# Patient Record
Sex: FEMALE | Race: WHITE | Employment: OTHER | URBAN - METROPOLITAN AREA
[De-identification: names, ages, dates, MRNs, and addresses within clinical notes are randomized per-mention and may not be internally consistent; named-entity substitution may affect disease eponyms.]

---

## 2023-03-11 ENCOUNTER — APPOINTMENT (OUTPATIENT)
Dept: GENERAL RADIOLOGY | Age: 58
End: 2023-03-11
Payer: MEDICARE

## 2023-03-11 ENCOUNTER — HOSPITAL ENCOUNTER (EMERGENCY)
Age: 58
Discharge: NURSING, REHAB OR LONG-TERM CARE | End: 2023-03-12
Attending: EMERGENCY MEDICINE | Admitting: HOSPITALIST
Payer: MEDICARE
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
## 2023-03-11 LAB
ANION GAP SERPL CALCULATED.3IONS-SCNC: 10 MMOL/L (ref 3–16)
BASOPHILS ABSOLUTE: 0.1 K/UL (ref 0–0.2)
BASOPHILS RELATIVE PERCENT: 0.4 %
BUN BLDV-MCNC: 10 MG/DL (ref 7–20)
CALCIUM SERPL-MCNC: 8.3 MG/DL (ref 8.3–10.6)
CHLORIDE BLD-SCNC: 101 MMOL/L (ref 99–110)
CO2: 25 MMOL/L (ref 21–32)
CREAT SERPL-MCNC: 0.6 MG/DL (ref 0.6–1.1)
EOSINOPHILS ABSOLUTE: 0 K/UL (ref 0–0.6)
EOSINOPHILS RELATIVE PERCENT: 0.1 %
GFR SERPL CREATININE-BSD FRML MDRD: >60 ML/MIN/{1.73_M2}
GLUCOSE BLD-MCNC: 112 MG/DL (ref 70–99)
HCT VFR BLD CALC: 31.8 % (ref 36–48)
HEMOGLOBIN: 10.5 G/DL (ref 12–16)
LYMPHOCYTES ABSOLUTE: 0.9 K/UL (ref 1–5.1)
LYMPHOCYTES RELATIVE PERCENT: 6.9 %
MCH RBC QN AUTO: 30.8 PG (ref 26–34)
MCHC RBC AUTO-ENTMCNC: 33 G/DL (ref 31–36)
MCV RBC AUTO: 93.1 FL (ref 80–100)
MONOCYTES ABSOLUTE: 1.3 K/UL (ref 0–1.3)
MONOCYTES RELATIVE PERCENT: 9.9 %
NEUTROPHILS ABSOLUTE: 10.6 K/UL (ref 1.7–7.7)
NEUTROPHILS RELATIVE PERCENT: 82.7 %
PDW BLD-RTO: 15 % (ref 12.4–15.4)
PLATELET # BLD: 210 K/UL (ref 135–450)
PMV BLD AUTO: 9 FL (ref 5–10.5)
POTASSIUM REFLEX MAGNESIUM: 5.3 MMOL/L (ref 3.5–5.1)
PRO-BNP: 237 PG/ML (ref 0–124)
RBC # BLD: 3.42 M/UL (ref 4–5.2)
SODIUM BLD-SCNC: 136 MMOL/L (ref 136–145)
TROPONIN: <0.01 NG/ML
WBC # BLD: 12.8 K/UL (ref 4–11)

## 2023-03-11 PROCEDURE — 99285 EMERGENCY DEPT VISIT HI MDM: CPT

## 2023-03-11 PROCEDURE — 84484 ASSAY OF TROPONIN QUANT: CPT

## 2023-03-11 PROCEDURE — 73502 X-RAY EXAM HIP UNI 2-3 VIEWS: CPT

## 2023-03-11 PROCEDURE — 73562 X-RAY EXAM OF KNEE 3: CPT

## 2023-03-11 PROCEDURE — 83880 ASSAY OF NATRIURETIC PEPTIDE: CPT

## 2023-03-11 PROCEDURE — 85025 COMPLETE CBC W/AUTO DIFF WBC: CPT

## 2023-03-11 PROCEDURE — 93005 ELECTROCARDIOGRAM TRACING: CPT | Performed by: STUDENT IN AN ORGANIZED HEALTH CARE EDUCATION/TRAINING PROGRAM

## 2023-03-11 PROCEDURE — 80048 BASIC METABOLIC PNL TOTAL CA: CPT

## 2023-03-11 RX ORDER — DROPERIDOL 2.5 MG/ML
0.62 INJECTION, SOLUTION INTRAMUSCULAR; INTRAVENOUS EVERY 6 HOURS PRN
Status: DISCONTINUED | OUTPATIENT
Start: 2023-03-11 | End: 2023-03-11

## 2023-03-11 RX ORDER — KETOROLAC TROMETHAMINE 30 MG/ML
15 INJECTION, SOLUTION INTRAMUSCULAR; INTRAVENOUS ONCE
Status: DISCONTINUED | OUTPATIENT
Start: 2023-03-11 | End: 2023-03-12 | Stop reason: HOSPADM

## 2023-03-12 VITALS
HEART RATE: 98 BPM | OXYGEN SATURATION: 93 % | TEMPERATURE: 98.7 F | SYSTOLIC BLOOD PRESSURE: 117 MMHG | DIASTOLIC BLOOD PRESSURE: 81 MMHG | RESPIRATION RATE: 21 BRPM

## 2023-03-12 PROBLEM — R55 SYNCOPE, NEAR: Status: ACTIVE | Noted: 2023-03-12

## 2023-03-12 LAB
EKG ATRIAL RATE: 110 BPM
EKG DIAGNOSIS: NORMAL
EKG P AXIS: 55 DEGREES
EKG P-R INTERVAL: 166 MS
EKG Q-T INTERVAL: 466 MS
EKG QRS DURATION: 118 MS
EKG QTC CALCULATION (BAZETT): 627 MS
EKG R AXIS: 57 DEGREES
EKG T AXIS: 40 DEGREES
EKG VENTRICULAR RATE: 109 BPM

## 2023-03-12 PROCEDURE — 1200000000 HC SEMI PRIVATE

## 2023-03-12 ASSESSMENT — ENCOUNTER SYMPTOMS
SHORTNESS OF BREATH: 0
ABDOMINAL PAIN: 0
BACK PAIN: 1
RECTAL PAIN: 0
SORE THROAT: 0

## 2023-03-12 NOTE — ED PROVIDER NOTES
ED Attending Attestation Note     Date of evaluation: 3/11/2023    This patient was seen by the resident. I have seen and examined the patient, agree with the workup, evaluation, management and diagnosis. The care plan has been discussed. I have reviewed the ECG and concur with the resident's interpretation. My assessment reveals adult female who presents after essentially a slide off of the couch today. He is moving all extremities at her baseline. No gross tenderness is appreciated at the time my exam with any extremities. Did initially complain of some back pain, but is very low energy/low risk fall. Did not strike her head or lose consciousness. Yvonne Wiley MD  03/12/23 0285

## 2023-03-12 NOTE — DISCHARGE INSTRUCTIONS
Arley Parsons was seen for evaluation after a fall. She had x-rays of her hips and knees that did not show any broken bones. She was thoroughly evaluated and felt to have no further injuries and is stable to return to your facility at this point. She can use Tylenol and ibuprofen as needed for pain.

## 2023-03-12 NOTE — ED PROVIDER NOTES
[facility]          EM RESIDENT NOTE       Date of evaluation: 3/11/2023    Chief Complaint     Fall    of Present Illness     Toshia Sherwood is a 65 y.o. female with PMHx of panhypopituitarism, bilateral knee OA, morbid obesity presenting from SNF for evaluation after a fall. Patient reports she was sitting on the couch when she became lightheaded and slid off the couch onto the ground. Patient notes she has chronic lightheadedness and this did not feel any different. Nursing home staff report they assisted her to the ground and patient did not strike her head or lose consciousness. Not on anticoagulation. Patient reports she has had back pain and bilateral knee pain since the fall. Denies any associated shortness of breath, chest pain, fevers, chills, nausea, vomiting, headache, focal weakness or numbness. Review of Systems     Review of Systems   Constitutional:  Negative for chills and fever. HENT:  Negative for congestion and sore throat. Respiratory:  Negative for shortness of breath. Cardiovascular:  Negative for chest pain. Gastrointestinal:  Negative for abdominal pain and rectal pain. Genitourinary:  Negative for dysuria. Musculoskeletal:  Positive for arthralgias and back pain. Negative for neck pain. Neurological:  Positive for dizziness and light-headedness. Negative for weakness and headaches. Past Medical, Surgical, Family, and Social History     She has no past medical history on file. She has no past surgical history on file. Her family history is not on file. She     Medications     Previous Medications    No medications on file       Allergies     She is allergic to ketorolac, sulfanilamide, and tramadol. Physical Exam     INITIAL VITALS: BP: 110/64, Temp: 98.7 °F (37.1 °C), Heart Rate: (!) 110, Resp: 21, SpO2: 93 %   Physical Exam  Vitals and nursing note reviewed. Constitutional:       Appearance: She is obese.    HENT: Head: Normocephalic and atraumatic. Mouth/Throat:      Mouth: Mucous membranes are moist.   Eyes:      Extraocular Movements: Extraocular movements intact. Pupils: Pupils are equal, round, and reactive to light. Cardiovascular:      Rate and Rhythm: Regular rhythm. Tachycardia present. Pulmonary:      Effort: Pulmonary effort is normal. No respiratory distress. Breath sounds: No wheezing or rales. Abdominal:      General: There is no distension. Palpations: Abdomen is soft. Tenderness: There is no abdominal tenderness. Musculoskeletal:      Comments: Diffuse back tenderness on exam without any focality along midline versus paraspinal.  Tender to palpation over right hip and right knee without any overlying erythema/warmth, no effusion, moving all joints spontaneously without difficulty, neurovascularly intact in the bilateral lower extremities. Skin:     General: Skin is warm and dry. Neurological:      General: No focal deficit present. Mental Status: She is alert and oriented to person, place, and time. Psychiatric:         Mood and Affect: Mood normal.         Behavior: Behavior normal.       DiagnosticResults     EKG   Interpreted in conjunction with emergencydepartment physician Stalin Reddy, *  Sinus tachycardia, rate 109. RBBB. Normal axis. QT prolonged in 600s. No acute ST/T change. RADIOLOGY:  XR HIP 2-3 VW W PELVIS RIGHT   Final Result      No radiographic evidence of acute fracture. XR KNEE RIGHT (3 VIEWS)   Final Result      No radiographic evidence of acute fracture. Mild degenerative changes.           LABS:   Results for orders placed or performed during the hospital encounter of 03/11/23   Troponin   Result Value Ref Range    Troponin <0.01 <0.01 ng/mL   CBC with Auto Differential   Result Value Ref Range    WBC 12.8 (H) 4.0 - 11.0 K/uL    RBC 3.42 (L) 4.00 - 5.20 M/uL    Hemoglobin 10.5 (L) 12.0 - 16.0 g/dL    Hematocrit 31.8 (L) 36.0 - 48.0 %    MCV 93.1 80.0 - 100.0 fL    MCH 30.8 26.0 - 34.0 pg    MCHC 33.0 31.0 - 36.0 g/dL    RDW 15.0 12.4 - 15.4 %    Platelets 210 135 - 450 K/uL    MPV 9.0 5.0 - 10.5 fL    Neutrophils % 82.7 %    Lymphocytes % 6.9 %    Monocytes % 9.9 %    Eosinophils % 0.1 %    Basophils % 0.4 %    Neutrophils Absolute 10.6 (H) 1.7 - 7.7 K/uL    Lymphocytes Absolute 0.9 (L) 1.0 - 5.1 K/uL    Monocytes Absolute 1.3 0.0 - 1.3 K/uL    Eosinophils Absolute 0.0 0.0 - 0.6 K/uL    Basophils Absolute 0.1 0.0 - 0.2 K/uL   BMP w/ Reflex to MG   Result Value Ref Range    Sodium 136 136 - 145 mmol/L    Potassium reflex Magnesium 5.3 (H) 3.5 - 5.1 mmol/L    Chloride 101 99 - 110 mmol/L    CO2 25 21 - 32 mmol/L    Anion Gap 10 3 - 16    Glucose 112 (H) 70 - 99 mg/dL    BUN 10 7 - 20 mg/dL    Creatinine 0.6 0.6 - 1.1 mg/dL    Est, Glom Filt Rate >60 >60    Calcium 8.3 8.3 - 10.6 mg/dL   Brain Natriuretic Peptide   Result Value Ref Range    Pro- (H) 0 - 124 pg/mL       ED BEDSIDE ULTRASOUND:  No results found.    RECENT VITALS:  BP: 110/64, Temp: 98.7 °F (37.1 °C), Heart Rate: (!) 110,Resp: 21, SpO2: 93 %       ED Course     Nursing Notes, Past Medical Hx, Past Surgical Hx, Social Hx, Allergies, and Family Hx were reviewed.         The patient was given the followingmedications:  Orders Placed This Encounter   Medications    DISCONTD: droperidol (INAPSINE) injection 0.625 mg    ketorolac (TORADOL) injection 15 mg       CONSULTS:  None    MEDICAL DECISION MAKING / ASSESSMENT / PLAN     Melani Anguiano is a 57 y.o. female with past medical history as noted above presents from SNF for evaluation after she slid off the couch.  She reports back pain and knee pain after the fall.  On my evaluation she is in no acute distress, tachycardic in triage but resolved without intervention.  She has diffuse pain over the back without any focality over the midline or certain spinal level.  She has mild tenderness over the right hip and right  knee but ranging these joints without issue.  Neurovascular intact in the extremities.  X-ray of the pelvis and right hip was obtained as well as of the right knee without any fracture.  She was treated with Toradol with significant improvement in her pain.  On repeat exam no further injuries identified.  Patient noted she had lightheadedness before her fall but states this is chronic and unchanged.  Her labs are notable for very mild leukocytosis but is afebrile without any recent infectious symptoms, mild anemia with Hb of 10.5, unremarkable renal profile (notably normal sodium with h/o hyponatremia). Non-ischemic EKG, normal troponin and BNP. Lower suspicion for acute cardiac etiology at this point.     She was transferred back to her facility in stable condition.    Is this patient to be included in the SEP-1 core measure due to severe sepsis or septic shock? No Exclusion criteria - the patient is NOT to be included for SEP-1 Core Measure due to: Infection is not suspected    This patient was also evaluated by the attending physician. All care plans werediscussed and agreed upon.    Clinical Impression     1. Fall, initial encounter        Disposition     PATIENT REFERRED TO:  No follow-up provider specified.    DISCHARGE MEDICATIONS:  New Prescriptions    No medications on file       DISPOSITION Decision To Discharge 03/12/2023 12:32:01 AM       Justine Milligan, MD  03/12/23 0423

## 2023-03-16 ENCOUNTER — APPOINTMENT (OUTPATIENT)
Dept: GENERAL RADIOLOGY | Age: 58
DRG: 313 | End: 2023-03-16
Payer: MEDICARE

## 2023-03-16 ENCOUNTER — HOSPITAL ENCOUNTER (INPATIENT)
Age: 58
LOS: 5 days | Discharge: NURSING, REHAB OR LONG-TERM CARE | DRG: 313 | End: 2023-03-22
Attending: EMERGENCY MEDICINE | Admitting: INTERNAL MEDICINE
Payer: MEDICARE

## 2023-03-16 DIAGNOSIS — F41.9 ANXIETY DISORDER, UNSPECIFIED TYPE: ICD-10-CM

## 2023-03-16 DIAGNOSIS — R07.9 CHEST PAIN, UNSPECIFIED TYPE: Primary | ICD-10-CM

## 2023-03-16 LAB
ANION GAP SERPL CALCULATED.3IONS-SCNC: 9 MMOL/L (ref 3–16)
BASOPHILS # BLD: 0 K/UL (ref 0–0.2)
BASOPHILS NFR BLD: 0.4 %
BUN SERPL-MCNC: 6 MG/DL (ref 7–20)
CALCIUM SERPL-MCNC: 8.6 MG/DL (ref 8.3–10.6)
CHLORIDE SERPL-SCNC: 104 MMOL/L (ref 99–110)
CO2 SERPL-SCNC: 28 MMOL/L (ref 21–32)
CREAT SERPL-MCNC: <0.5 MG/DL (ref 0.6–1.1)
DEPRECATED RDW RBC AUTO: 15.2 % (ref 12.4–15.4)
EOSINOPHIL # BLD: 0.2 K/UL (ref 0–0.6)
EOSINOPHIL NFR BLD: 3.5 %
GFR SERPLBLD CREATININE-BSD FMLA CKD-EPI: >60 ML/MIN/{1.73_M2}
GLUCOSE SERPL-MCNC: 101 MG/DL (ref 70–99)
HCT VFR BLD AUTO: 33.6 % (ref 36–48)
HGB BLD-MCNC: 10.9 G/DL (ref 12–16)
LYMPHOCYTES # BLD: 1.4 K/UL (ref 1–5.1)
LYMPHOCYTES NFR BLD: 28.5 %
MCH RBC QN AUTO: 30 PG (ref 26–34)
MCHC RBC AUTO-ENTMCNC: 32.5 G/DL (ref 31–36)
MCV RBC AUTO: 92.4 FL (ref 80–100)
MONOCYTES # BLD: 0.8 K/UL (ref 0–1.3)
MONOCYTES NFR BLD: 15.6 %
NEUTROPHILS # BLD: 2.6 K/UL (ref 1.7–7.7)
NEUTROPHILS NFR BLD: 52 %
NT-PROBNP SERPL-MCNC: 148 PG/ML (ref 0–124)
PLATELET # BLD AUTO: 195 K/UL (ref 135–450)
PMV BLD AUTO: 8.6 FL (ref 5–10.5)
POTASSIUM SERPL-SCNC: 3.8 MMOL/L (ref 3.5–5.1)
RBC # BLD AUTO: 3.63 M/UL (ref 4–5.2)
SODIUM SERPL-SCNC: 141 MMOL/L (ref 136–145)
TROPONIN T SERPL-MCNC: <0.01 NG/ML
WBC # BLD AUTO: 5 K/UL (ref 4–11)

## 2023-03-16 PROCEDURE — 36415 COLL VENOUS BLD VENIPUNCTURE: CPT

## 2023-03-16 PROCEDURE — 83880 ASSAY OF NATRIURETIC PEPTIDE: CPT

## 2023-03-16 PROCEDURE — 93005 ELECTROCARDIOGRAM TRACING: CPT | Performed by: PHYSICIAN ASSISTANT

## 2023-03-16 PROCEDURE — 99285 EMERGENCY DEPT VISIT HI MDM: CPT

## 2023-03-16 PROCEDURE — 71045 X-RAY EXAM CHEST 1 VIEW: CPT

## 2023-03-16 PROCEDURE — 84484 ASSAY OF TROPONIN QUANT: CPT

## 2023-03-16 PROCEDURE — 80048 BASIC METABOLIC PNL TOTAL CA: CPT

## 2023-03-16 PROCEDURE — 85025 COMPLETE CBC W/AUTO DIFF WBC: CPT

## 2023-03-16 ASSESSMENT — PAIN DESCRIPTION - LOCATION: LOCATION: CHEST

## 2023-03-16 ASSESSMENT — PAIN - FUNCTIONAL ASSESSMENT: PAIN_FUNCTIONAL_ASSESSMENT: 0-10

## 2023-03-16 ASSESSMENT — PAIN DESCRIPTION - DESCRIPTORS: DESCRIPTORS: PRESSURE

## 2023-03-16 ASSESSMENT — PAIN SCALES - GENERAL: PAINLEVEL_OUTOF10: 9

## 2023-03-16 ASSESSMENT — ENCOUNTER SYMPTOMS
COUGH: 0
SHORTNESS OF BREATH: 1

## 2023-03-17 PROBLEM — R07.9 CHEST PAIN: Status: ACTIVE | Noted: 2023-03-17

## 2023-03-17 LAB
AMPHETAMINES UR QL SCN>1000 NG/ML: ABNORMAL
BARBITURATES UR QL SCN>200 NG/ML: ABNORMAL
BENZODIAZ UR QL SCN>200 NG/ML: POSITIVE
CANNABINOIDS UR QL SCN>50 NG/ML: ABNORMAL
CHOLEST SERPL-MCNC: 118 MG/DL (ref 0–199)
COCAINE UR QL SCN: ABNORMAL
DRUG SCREEN COMMENT UR-IMP: ABNORMAL
EKG ATRIAL RATE: 101 BPM
EKG ATRIAL RATE: 89 BPM
EKG DIAGNOSIS: NORMAL
EKG DIAGNOSIS: NORMAL
EKG P AXIS: 55 DEGREES
EKG P AXIS: 67 DEGREES
EKG P-R INTERVAL: 166 MS
EKG P-R INTERVAL: 170 MS
EKG Q-T INTERVAL: 368 MS
EKG Q-T INTERVAL: 414 MS
EKG QRS DURATION: 132 MS
EKG QRS DURATION: 136 MS
EKG QTC CALCULATION (BAZETT): 477 MS
EKG QTC CALCULATION (BAZETT): 503 MS
EKG R AXIS: 58 DEGREES
EKG R AXIS: 60 DEGREES
EKG T AXIS: 28 DEGREES
EKG T AXIS: 8 DEGREES
EKG VENTRICULAR RATE: 101 BPM
EKG VENTRICULAR RATE: 89 BPM
FENTANYL SCREEN, URINE: ABNORMAL
GLUCOSE BLD-MCNC: 114 MG/DL (ref 70–99)
HDLC SERPL-MCNC: 17 MG/DL (ref 40–60)
LDLC SERPL CALC-MCNC: 80 MG/DL
LV EF: 58 %
LVEF MODALITY: NORMAL
METHADONE UR QL SCN>300 NG/ML: ABNORMAL
OPIATES UR QL SCN>300 NG/ML: ABNORMAL
OXYCODONE UR QL SCN: ABNORMAL
PCP UR QL SCN>25 NG/ML: ABNORMAL
PERFORMED ON: ABNORMAL
PH UR STRIP: 7.5 [PH]
TRIGL SERPL-MCNC: 104 MG/DL (ref 0–150)
TROPONIN T SERPL-MCNC: <0.01 NG/ML
VLDLC SERPL CALC-MCNC: 21 MG/DL

## 2023-03-17 PROCEDURE — 1200000000 HC SEMI PRIVATE

## 2023-03-17 PROCEDURE — 6370000000 HC RX 637 (ALT 250 FOR IP): Performed by: HOSPITALIST

## 2023-03-17 PROCEDURE — 36415 COLL VENOUS BLD VENIPUNCTURE: CPT

## 2023-03-17 PROCEDURE — 80061 LIPID PANEL: CPT

## 2023-03-17 PROCEDURE — 80307 DRUG TEST PRSMV CHEM ANLYZR: CPT

## 2023-03-17 PROCEDURE — 6360000002 HC RX W HCPCS: Performed by: INTERNAL MEDICINE

## 2023-03-17 PROCEDURE — 99222 1ST HOSP IP/OBS MODERATE 55: CPT | Performed by: INTERNAL MEDICINE

## 2023-03-17 PROCEDURE — 2580000003 HC RX 258: Performed by: INTERNAL MEDICINE

## 2023-03-17 PROCEDURE — 84484 ASSAY OF TROPONIN QUANT: CPT

## 2023-03-17 PROCEDURE — 93005 ELECTROCARDIOGRAM TRACING: CPT | Performed by: INTERNAL MEDICINE

## 2023-03-17 PROCEDURE — 93010 ELECTROCARDIOGRAM REPORT: CPT | Performed by: INTERNAL MEDICINE

## 2023-03-17 PROCEDURE — C8929 TTE W OR WO FOL WCON,DOPPLER: HCPCS

## 2023-03-17 RX ORDER — ONDANSETRON 4 MG/1
4 TABLET, ORALLY DISINTEGRATING ORAL EVERY 8 HOURS PRN
Status: DISCONTINUED | OUTPATIENT
Start: 2023-03-17 | End: 2023-03-17 | Stop reason: ALTCHOICE

## 2023-03-17 RX ORDER — ACETAMINOPHEN 325 MG/1
650 TABLET ORAL EVERY 6 HOURS PRN
Status: DISCONTINUED | OUTPATIENT
Start: 2023-03-17 | End: 2023-03-22 | Stop reason: HOSPADM

## 2023-03-17 RX ORDER — ALPRAZOLAM 2 MG/1
2 TABLET ORAL EVERY 8 HOURS
Status: ON HOLD | COMMUNITY
End: 2023-03-20 | Stop reason: HOSPADM

## 2023-03-17 RX ORDER — MECOBALAMIN 5000 MCG
10 TABLET,DISINTEGRATING ORAL NIGHTLY
Status: DISCONTINUED | OUTPATIENT
Start: 2023-03-17 | End: 2023-03-22 | Stop reason: HOSPADM

## 2023-03-17 RX ORDER — NITROGLYCERIN 0.4 MG/1
0.4 TABLET SUBLINGUAL EVERY 5 MIN PRN
Status: DISCONTINUED | OUTPATIENT
Start: 2023-03-17 | End: 2023-03-22 | Stop reason: HOSPADM

## 2023-03-17 RX ORDER — ACETAMINOPHEN 650 MG/1
650 SUPPOSITORY RECTAL EVERY 6 HOURS PRN
Status: DISCONTINUED | OUTPATIENT
Start: 2023-03-17 | End: 2023-03-22 | Stop reason: HOSPADM

## 2023-03-17 RX ORDER — ASPIRIN 81 MG/1
81 TABLET, CHEWABLE ORAL DAILY
Status: ON HOLD | COMMUNITY
End: 2023-03-20 | Stop reason: SDUPTHER

## 2023-03-17 RX ORDER — AMITRIPTYLINE HYDROCHLORIDE 75 MG/1
75 TABLET, FILM COATED ORAL NIGHTLY
Status: ON HOLD | COMMUNITY
End: 2023-03-20 | Stop reason: SDUPTHER

## 2023-03-17 RX ORDER — ONDANSETRON 2 MG/ML
4 INJECTION INTRAMUSCULAR; INTRAVENOUS EVERY 6 HOURS PRN
Status: DISCONTINUED | OUTPATIENT
Start: 2023-03-17 | End: 2023-03-17 | Stop reason: ALTCHOICE

## 2023-03-17 RX ORDER — SENNA PLUS 8.6 MG/1
1 TABLET ORAL DAILY
Status: ON HOLD | COMMUNITY
End: 2023-03-20 | Stop reason: SDUPTHER

## 2023-03-17 RX ORDER — MORPHINE SULFATE 2 MG/ML
2 INJECTION, SOLUTION INTRAMUSCULAR; INTRAVENOUS
Status: COMPLETED | OUTPATIENT
Start: 2023-03-17 | End: 2023-03-18

## 2023-03-17 RX ORDER — NITROFURANTOIN 25; 75 MG/1; MG/1
100 CAPSULE ORAL 2 TIMES DAILY
Status: ON HOLD | COMMUNITY
Start: 2023-03-15 | End: 2023-03-20 | Stop reason: HOSPADM

## 2023-03-17 RX ORDER — ALPRAZOLAM 0.5 MG/1
2 TABLET ORAL EVERY 8 HOURS
Status: DISCONTINUED | OUTPATIENT
Start: 2023-03-17 | End: 2023-03-22 | Stop reason: HOSPADM

## 2023-03-17 RX ORDER — BUSPIRONE HYDROCHLORIDE 5 MG/1
5 TABLET ORAL 2 TIMES DAILY
Status: DISCONTINUED | OUTPATIENT
Start: 2023-03-17 | End: 2023-03-22 | Stop reason: HOSPADM

## 2023-03-17 RX ORDER — ENOXAPARIN SODIUM 100 MG/ML
30 INJECTION SUBCUTANEOUS 2 TIMES DAILY
Status: DISCONTINUED | OUTPATIENT
Start: 2023-03-17 | End: 2023-03-22 | Stop reason: HOSPADM

## 2023-03-17 RX ORDER — BUSPIRONE HYDROCHLORIDE 5 MG/1
5 TABLET ORAL 2 TIMES DAILY
Status: ON HOLD | COMMUNITY
End: 2023-03-20 | Stop reason: HOSPADM

## 2023-03-17 RX ORDER — CITALOPRAM 20 MG/1
20 TABLET ORAL DAILY
COMMUNITY

## 2023-03-17 RX ORDER — SODIUM CHLORIDE 9 MG/ML
INJECTION, SOLUTION INTRAVENOUS PRN
Status: DISCONTINUED | OUTPATIENT
Start: 2023-03-17 | End: 2023-03-22 | Stop reason: HOSPADM

## 2023-03-17 RX ORDER — DESMOPRESSIN ACETATE 0.1 MG/ML
1 SOLUTION NASAL DAILY
Status: ON HOLD | COMMUNITY
End: 2023-03-20 | Stop reason: SDUPTHER

## 2023-03-17 RX ORDER — LEVOTHYROXINE SODIUM 0.12 MG/1
125 TABLET ORAL DAILY
Status: ON HOLD | COMMUNITY
End: 2023-03-20 | Stop reason: SDUPTHER

## 2023-03-17 RX ORDER — IPRATROPIUM BROMIDE AND ALBUTEROL SULFATE 2.5; .5 MG/3ML; MG/3ML
1 SOLUTION RESPIRATORY (INHALATION) EVERY 4 HOURS PRN
Status: DISCONTINUED | OUTPATIENT
Start: 2023-03-17 | End: 2023-03-22 | Stop reason: HOSPADM

## 2023-03-17 RX ORDER — CITALOPRAM 20 MG/1
20 TABLET ORAL DAILY
Status: DISCONTINUED | OUTPATIENT
Start: 2023-03-17 | End: 2023-03-22 | Stop reason: HOSPADM

## 2023-03-17 RX ORDER — ASPIRIN 81 MG/1
81 TABLET, CHEWABLE ORAL DAILY
Status: DISCONTINUED | OUTPATIENT
Start: 2023-03-17 | End: 2023-03-22 | Stop reason: HOSPADM

## 2023-03-17 RX ORDER — LEVOTHYROXINE SODIUM 0.12 MG/1
125 TABLET ORAL DAILY
Status: DISCONTINUED | OUTPATIENT
Start: 2023-03-17 | End: 2023-03-22 | Stop reason: HOSPADM

## 2023-03-17 RX ORDER — SODIUM CHLORIDE 0.9 % (FLUSH) 0.9 %
5-40 SYRINGE (ML) INJECTION EVERY 12 HOURS SCHEDULED
Status: DISCONTINUED | OUTPATIENT
Start: 2023-03-17 | End: 2023-03-22 | Stop reason: HOSPADM

## 2023-03-17 RX ORDER — SENNA PLUS 8.6 MG/1
1 TABLET ORAL DAILY
Status: DISCONTINUED | OUTPATIENT
Start: 2023-03-17 | End: 2023-03-22 | Stop reason: HOSPADM

## 2023-03-17 RX ORDER — ACETAMINOPHEN 325 MG/1
650 TABLET ORAL EVERY 6 HOURS PRN
COMMUNITY

## 2023-03-17 RX ORDER — AMITRIPTYLINE HYDROCHLORIDE 50 MG/1
75 TABLET, FILM COATED ORAL NIGHTLY
Status: DISCONTINUED | OUTPATIENT
Start: 2023-03-17 | End: 2023-03-22

## 2023-03-17 RX ORDER — SODIUM CHLORIDE 0.9 % (FLUSH) 0.9 %
5-40 SYRINGE (ML) INJECTION PRN
Status: DISCONTINUED | OUTPATIENT
Start: 2023-03-17 | End: 2023-03-22 | Stop reason: HOSPADM

## 2023-03-17 RX ORDER — POLYETHYLENE GLYCOL 3350 17 G/17G
17 POWDER, FOR SOLUTION ORAL DAILY PRN
Status: DISCONTINUED | OUTPATIENT
Start: 2023-03-17 | End: 2023-03-22 | Stop reason: HOSPADM

## 2023-03-17 RX ORDER — MELATONIN 10 MG
10 CAPSULE ORAL NIGHTLY
Status: ON HOLD | COMMUNITY
End: 2023-03-20 | Stop reason: SDUPTHER

## 2023-03-17 RX ADMIN — ENOXAPARIN SODIUM 30 MG: 100 INJECTION SUBCUTANEOUS at 20:38

## 2023-03-17 RX ADMIN — CITALOPRAM HYDROBROMIDE 20 MG: 20 TABLET ORAL at 16:48

## 2023-03-17 RX ADMIN — LEVOTHYROXINE SODIUM 125 MCG: 0.12 TABLET ORAL at 15:24

## 2023-03-17 RX ADMIN — SENNOSIDES 8.6 MG: 8.6 TABLET, COATED ORAL at 15:24

## 2023-03-17 RX ADMIN — AMITRIPTYLINE HYDROCHLORIDE 75 MG: 50 TABLET, FILM COATED ORAL at 20:25

## 2023-03-17 RX ADMIN — Medication 10 MG: at 20:25

## 2023-03-17 RX ADMIN — DESMOPRESSIN ACETATE 250 MCG: 0.2 TABLET ORAL at 21:27

## 2023-03-17 RX ADMIN — ASPIRIN 81 MG 81 MG: 81 TABLET ORAL at 15:24

## 2023-03-17 RX ADMIN — SODIUM CHLORIDE, PRESERVATIVE FREE 10 ML: 5 INJECTION INTRAVENOUS at 20:28

## 2023-03-17 RX ADMIN — BUSPIRONE HYDROCHLORIDE 5 MG: 5 TABLET ORAL at 15:25

## 2023-03-17 RX ADMIN — MORPHINE SULFATE 2 MG: 2 INJECTION, SOLUTION INTRAMUSCULAR; INTRAVENOUS at 20:38

## 2023-03-17 RX ADMIN — ENOXAPARIN SODIUM 30 MG: 100 INJECTION SUBCUTANEOUS at 12:48

## 2023-03-17 RX ADMIN — SODIUM CHLORIDE, PRESERVATIVE FREE 10 ML: 5 INJECTION INTRAVENOUS at 12:48

## 2023-03-17 RX ADMIN — BUSPIRONE HYDROCHLORIDE 5 MG: 5 TABLET ORAL at 20:27

## 2023-03-17 RX ADMIN — ALPRAZOLAM 2 MG: 0.5 TABLET ORAL at 20:25

## 2023-03-17 ASSESSMENT — PAIN SCALES - GENERAL
PAINLEVEL_OUTOF10: 7
PAINLEVEL_OUTOF10: 0
PAINLEVEL_OUTOF10: 7
PAINLEVEL_OUTOF10: 0

## 2023-03-17 ASSESSMENT — PAIN DESCRIPTION - LOCATION: LOCATION: BACK

## 2023-03-17 ASSESSMENT — PAIN - FUNCTIONAL ASSESSMENT: PAIN_FUNCTIONAL_ASSESSMENT: ACTIVITIES ARE NOT PREVENTED

## 2023-03-17 ASSESSMENT — PAIN DESCRIPTION - ONSET: ONSET: ON-GOING

## 2023-03-17 ASSESSMENT — PAIN DESCRIPTION - ORIENTATION: ORIENTATION: LOWER

## 2023-03-17 ASSESSMENT — PAIN DESCRIPTION - PAIN TYPE: TYPE: CHRONIC PAIN

## 2023-03-17 ASSESSMENT — PAIN DESCRIPTION - DESCRIPTORS: DESCRIPTORS: ACHING;PRESSURE

## 2023-03-17 NOTE — H&P
daily   Yes Historical Provider, MD       Allergies:  Ketorolac, Sulfanilamide, and Tramadol    Social History:  The patient currently lives     TOBACCO:   has no history on file for tobacco use. ETOH:   has no history on file for alcohol use. Family History:  Reviewed in detail and Positive as follows:    No family history on file. REVIEW OF SYSTEMS:   Positive and negative as noted in the HPI. All other systems reviewed and negative. PHYSICAL EXAM:    BP (!) 157/88   Pulse 90   Temp 98 °F (36.7 °C)   Resp 20   Ht 5' 3\" (1.6 m)   Wt 238 lb 1.6 oz (108 kg) Comment: Bariatric bed scale. Patient immobile. accuracy may be unreliable. SpO2 93%   BMI 42.18 kg/m²     General appearance: No apparent distress appears stated age and cooperative. HEENT Normal cephalic, atraumatic without obvious deformity. Pupils equal, round, and reactive to light. Extra ocular muscles intact. Conjunctivae/corneas clear. Neck: Supple, No jugular venous distention/bruits. Trachea midline without thyromegaly or adenopathy with full range of motion. Lungs: Clear to auscultation, bilaterally without Rales/Wheezes/Rhonchi with good respiratory effort. Heart: Regular rate and rhythm with Normal S1/S2 without murmurs, rubs or gallops, point of maximum impulse non-displaced  Abdomen: Soft, non-tender or non-distended without rigidity or guarding and positive bowel sounds all four quadrants. Extremities: No clubbing, cyanosis, or edema bilaterally. Full range of motion without deformity and normal gait intact. Skin: Skin color, texture, turgor normal.  No rashes or lesions. Neurologic: Alert and oriented X 3, neurovascularly intact with sensory/motor intact upper extremities/lower extremities, bilaterally. Cranial nerves: II-XII intact, grossly non-focal.  Mental status: Alert, oriented, thought content appropriate.   Capillary refill is brisk  Pulses 2 +  EKG:  I have reviewed the EKG with the following interpretation: no acute ST T changes    The following labs reviewed personally:   CBC   Recent Labs     03/16/23 2257   WBC 5.0   HGB 10.9*   HCT 33.6*         RENAL  Recent Labs     03/16/23 2257      K 3.8      CO2 28   BUN 6*   CREATININE <0.5*     LFT'S  No results for input(s): AST, ALT, ALB, BILIDIR, BILITOT, ALKPHOS in the last 72 hours. COAG  No results for input(s): INR in the last 72 hours. CARDIAC ENZYMES  Recent Labs     03/16/23 2257 03/17/23 0042   TROPONINI <0.01 <0.01       U/A:  No results found for: NITRITE, COLORU, WBCUA, RBCUA, MUCUS, BACTERIA, CLARITYU, SPECGRAV, LEUKOCYTESUR, BLOODU, GLUCOSEU, AMORPHOUS    ABG  No results found for: VIE2LNS, BEART, R3OATAHB, PHART, THGBART, IVX1YYI, PO2ART, DKV8WIG        Active Hospital Problems    Diagnosis Date Noted    Chest pain [R07.9] 03/17/2023     Priority: Medium         ASSESSMENT/PLAN:    Chest pain     H/o CVA    Hypothyroidism    Anxiety DO    H/o DI ? Plan    Admit inpatient  Telemetry moniter  Trend troponin x 3  Cardiology consult      Continue home meds for  DI   Continue home meds for anxiety and depression  Continue synthroid     DVT Prophylaxis: lovenox    Diet: Diet NPO  Code Status: Full Code  PT/OT Eval Status:   Dipti Aburto MD    Thank you Jose Ramon Mathews MD for the opportunity to be involved in this patient's care. If you have any questions or concerns please feel free to contact me at [phone].

## 2023-03-17 NOTE — PROGRESS NOTES
Mariana friend ([phone]) called for status update. Patient verbalizes ok for this nurse to update Laura. Laura concerned that patient is \"talking out of her head\". Requesting a drug screen. States that patient wants help with moving facility as current facility is \"beating her up\". Patient points to left eye and states that it is swollen and bruised from \"being dropped\". Left eye without evidence of swelling or bruising. Patient does have scattered bruising on arms consistent with IV/lab sticks.

## 2023-03-17 NOTE — ED PROVIDER NOTES
[address] ENCOUNTER          PHYSICIAN ASSISTANT NOTE     Date of evaluation: 3/16/2023    Chief Complaint     Chest Pain (Pt complaining of middle chest pain which comes and goes. The pain started one hour ago. She was given 1 nitro which did not help.  )    History of Present Illness     HPI: Elaine Richter is a 65 y.o. female with history of morbid obesity, hyponatremia, diabetes insipidus, CVA who presents to the emergency department with chest pain. Patient started having some chest pain earlier today. It is intermittent in nature. She denies any known alleviating or aggravating factors. She has had some associated shortness of breath. She describes the chest pain as a chest pressure. She denies nausea or vomiting. She denies any increased leg swelling that she has appreciated. Patient was also very agitated as she states that her phone was stolen. She is initially refusing to answer my questions until I agreed to call the police, ultimately was agreeable to answering questions in regards to her chest pain. No cough, fevers or chills. With the exception of the above, there are no aggravating or alleviating factors. Review of Systems     Review of Systems   Constitutional:  Negative for chills and fever. Respiratory:  Positive for shortness of breath. Negative for cough. Cardiovascular:  Positive for chest pain. Neurological:  Negative for dizziness and headaches. As stated above, all other systems reviewed and are otherwise negative. Past Medical, Surgical, Family, and Social History     She has no past medical history on file. She has no past surgical history on file. Her family history is not on file. She     Medications     Previous Medications    No medications on file       Allergies     She is allergic to ketorolac, sulfanilamide, and tramadol.     Physical Exam     INITIAL VITALS: BP: 125/74, Temp: 98.2 °F (36.8 °C), Heart Rate: (!) 103,

## 2023-03-17 NOTE — PROGRESS NOTES
Pt admitted to 6324. Pt is A and O x4. Pt complains of pain 8/10. Vitals taken. Pt is on a purewick. Pt is bedbound. Pt has what appears to be a small skin tear on sacrum. Mepilex applied. 4 eyes completed. Echo started during assessment.

## 2023-03-17 NOTE — CONSULTS
[address]         Reason for Consultation/Chief Complaint: \"I have been having chest pain. \"       History of Present Illness:  Karen Pike is a 65 y.o. patient who presented to the hospital with complaints of chest pain yesterday that has resolved with negative troponin x 3. Pt has hx of CVA. Past Medical History:   has no past medical history on file. Surgical History:   has no past surgical history on file. Social History:        Family History:  No evidence for sudden cardiac death or premature CAD    Home Medications:  Were reviewed and are listed in nursing record. and/or listed below  Prior to Admission medications    Medication Sig Start Date End Date Taking? Authorizing Provider   ALPRAZolam Alyson Kendrick) 2 MG tablet Take 2 mg by mouth every 8 (eight) hours.    Yes Historical Provider, MD   nitrofurantoin, macrocrystal-monohydrate, (MACROBID) 100 MG capsule Take 100 mg by mouth 2 times daily Indications: Acute Urinary Tract Infection 3/15/23 3/20/23 Yes Historical Provider, MD Reema Hawkins (OYSTER CALCIUM PO) Take by mouth daily   Yes Historical Provider, MD   aspirin 81 MG chewable tablet Take 81 mg by mouth daily   Yes Historical Provider, MD   senna (SENOKOT) 8.6 MG tablet Take 1 tablet by mouth daily   Yes Historical Provider, MD   melatonin 10 MG CAPS capsule Take 10 mg by mouth nightly   Yes Historical Provider, MD   citalopram (CELEXA) 20 MG tablet Take 20 mg by mouth daily   Yes Historical Provider, MD   amitriptyline (ELAVIL) 75 MG tablet Take 75 mg by mouth nightly   Yes Historical Provider, MD   IBUPROFEN PO Take 800 mg by mouth every 8 hours as needed (pain)   Yes Historical Provider, MD   acetaminophen (TYLENOL) 325 MG tablet Take 650 mg by mouth every 6 hours as needed for Pain   Yes Historical Provider, MD   busPIRone (BUSPAR) 5 MG tablet Take 5 mg by mouth 2 times daily   Yes Historical Provider, MD   levothyroxine (SYNTHROID) 125 MCG tablet Take further cardiac evaluation given 3 negative troponins and no evolutionary ECG changes and no further chest pain on exam this am.    Thank you for allowing to us to participate in the care or Mica Asher. The note was completed using EMR. Every effort was made to ensure accuracy; however, inadvertent computerized transcription errors may be present.        Divine Longoria MD [facility]

## 2023-03-17 NOTE — ED PROVIDER NOTES
ED Attending Attestation Note     Date of evaluation: 3/16/2023    This patient was seen by the advance practice provider. I have seen and examined the patient, agree with the workup, evaluation, management and diagnosis. The care plan has been discussed. I have reviewed the ECG and concur with the RADHA's interpretation. My assessment reveals patient with history of hypertension, obesity, and prior CVA presents complaining of chest pain. Patient notes a sensation of pressure located in the center of her chest with associated shortness of breath. She denies any fevers or cough. She denies having symptoms like this in the past.  Patient is hemodynamically stable on arrival with clear breath sounds and normal heart sounds. Will check laboratory studies, imaging.      Shelley Temple MD  03/17/23 1848

## 2023-03-17 NOTE — ED NOTES
[] Verbal [] Rahel [place]in[place] Scale  Pain Scale: Pain Assessment  Pain Assessment: 0-10  Pain Level: 9  Pain Location: Chest  Pain Descriptors: Pressure  Last documented pain score (0-10 scale) Pain Level: 9  Last documented pain medication administered: na  Mental Status: oriented  Orientation Level:    NIH Score:    C-SSRS: Risk of Suicide: No Risk  Bedside swallow:    Sony Coma Scale (GCS): Active LDA's:   Peripheral IV 03/16/23 Left Forearm (Active)     PO Status: Regular  Pertinent or High Risk Medications/Drips: no   o If Yes, please provide details: na  Pending Blood Product Administration: no       You may also review the ED PT Care Timeline found under the Summary Nursing Index tab. Recommendation    Pending orders na  Plan for Discharge (if known):    Additional Comments: bariatric bed   If any further questions, please call Sending RN at [phone]    Electronically signed by: Electronically signed by Amanda Lr RN on 3/17/2023 at 12:44 AM       Amanda Lr, [address] [place] [address]  03/17/23 4638

## 2023-03-17 NOTE — PROGRESS NOTES
4 Eyes Skin Assessment     NAME:  Odalis Cary  YOB: 1965  MEDICAL RECORD NUMBER:  [id number]    The patient is being assessed for  Admission    I agree that One RN has performed a thorough Head to Toe Skin Assessment on the patient. ALL assessment sites listed below have been assessed. Areas assessed by both nurses:    Head, Face, Ears, Shoulders, Back, Chest, Arms, Elbows, Hands, Sacrum. Buttock, Coccyx, Ischium, and Legs. Feet and Heels        Does the Patient have a Wound? Yes wound(s) were present on assessment.  LDA wound assessment was Initiated and completed by RN       Pato Prevention initiated by RN: Yes   Wound Care Orders initiated by RN: No    Pressure Injury (Stage 3,4, Unstageable, DTI, NWPT, and Complex wounds) if present, place referral order by RN under : No    New and Established Ostomies, if present place, referral order under : No      Nurse 1 eSignature: Electronically signed by Rosalio Kennedy RN on 3/17/23 at 4:24 PM EDT    **SHARE this note so that the co-signing nurse can place an eSignature**    Nurse 2 eSignature: Electronically signed by Jeanie Miller RN on 3/17/23 at 7:02 PM EDT

## 2023-03-17 NOTE — PROGRESS NOTES
Pharmacist Review and Automatic Dose Adjustment of Prophylactic Enoxaparin    The reviewing pharmacist has made an adjustment to the ordered enoxaparin dose or converted to UFH per the approved [facility] protocol and table as defined below. Plan / Rationale: Based upon the patient's weight and renal function, the ordered dose of 40 mg QDAY has been converted to 30 mg BID. Thank you,  Kitty Callahan, [facility]  3/17/2023, 3:49 AM      Maia Arriaga is a 65 y.o. female. Recent Labs     03/16/23  2257   CREATININE <0.5*       CrCl cannot be calculated (Unknown ideal weight. ). 2 recent admissions with weights of 148.8 kg and 147 kg (both within 1 week)    Recent Labs     03/16/23  2257   HGB 10.9*   HCT 33.6*        No results for input(s): INR in the last 72 hours.     Height:   Ht Readings from Last 1 Encounters:   03/17/23 5' 3\" (1.6 m)     Weight:  Wt Readings from Last 1 Encounters:   No data found for [employer]

## 2023-03-18 LAB
GLUCOSE BLD-MCNC: 101 MG/DL (ref 70–99)
PERFORMED ON: ABNORMAL
TROPONIN T SERPL-MCNC: <0.01 NG/ML
TROPONIN T SERPL-MCNC: <0.01 NG/ML

## 2023-03-18 PROCEDURE — 2580000003 HC RX 258: Performed by: INTERNAL MEDICINE

## 2023-03-18 PROCEDURE — 36415 COLL VENOUS BLD VENIPUNCTURE: CPT

## 2023-03-18 PROCEDURE — 1200000000 HC SEMI PRIVATE

## 2023-03-18 PROCEDURE — 6370000000 HC RX 637 (ALT 250 FOR IP): Performed by: HOSPITALIST

## 2023-03-18 PROCEDURE — 6360000002 HC RX W HCPCS: Performed by: INTERNAL MEDICINE

## 2023-03-18 PROCEDURE — 84484 ASSAY OF TROPONIN QUANT: CPT

## 2023-03-18 RX ORDER — OXYCODONE HYDROCHLORIDE AND ACETAMINOPHEN 5; 325 MG/1; MG/1
1 TABLET ORAL EVERY 8 HOURS PRN
Qty: 6 TABLET | Refills: 0 | Status: SHIPPED | OUTPATIENT
Start: 2023-03-18 | End: 2023-03-20

## 2023-03-18 RX ORDER — OXYCODONE HYDROCHLORIDE AND ACETAMINOPHEN 5; 325 MG/1; MG/1
1 TABLET ORAL EVERY 8 HOURS PRN
Qty: 6 TABLET | Refills: 0 | Status: SHIPPED | OUTPATIENT
Start: 2023-03-18 | End: 2023-03-20 | Stop reason: HOSPADM

## 2023-03-18 RX ADMIN — ALPRAZOLAM 2 MG: 0.5 TABLET ORAL at 20:29

## 2023-03-18 RX ADMIN — ALPRAZOLAM 2 MG: 0.5 TABLET ORAL at 11:35

## 2023-03-18 RX ADMIN — SODIUM CHLORIDE, PRESERVATIVE FREE 10 ML: 5 INJECTION INTRAVENOUS at 20:33

## 2023-03-18 RX ADMIN — Medication 10 MG: at 20:29

## 2023-03-18 RX ADMIN — ASPIRIN 81 MG 81 MG: 81 TABLET ORAL at 09:14

## 2023-03-18 RX ADMIN — ENOXAPARIN SODIUM 30 MG: 100 INJECTION SUBCUTANEOUS at 09:13

## 2023-03-18 RX ADMIN — ALPRAZOLAM 2 MG: 0.5 TABLET ORAL at 06:25

## 2023-03-18 RX ADMIN — ENOXAPARIN SODIUM 30 MG: 100 INJECTION SUBCUTANEOUS at 20:30

## 2023-03-18 RX ADMIN — BUSPIRONE HYDROCHLORIDE 5 MG: 5 TABLET ORAL at 20:30

## 2023-03-18 RX ADMIN — DESMOPRESSIN ACETATE 250 MCG: 0.2 TABLET ORAL at 21:51

## 2023-03-18 RX ADMIN — MORPHINE SULFATE 2 MG: 2 INJECTION, SOLUTION INTRAMUSCULAR; INTRAVENOUS at 08:01

## 2023-03-18 RX ADMIN — SODIUM CHLORIDE, PRESERVATIVE FREE 10 ML: 5 INJECTION INTRAVENOUS at 08:02

## 2023-03-18 RX ADMIN — BUSPIRONE HYDROCHLORIDE 5 MG: 5 TABLET ORAL at 09:15

## 2023-03-18 RX ADMIN — LEVOTHYROXINE SODIUM 125 MCG: 0.12 TABLET ORAL at 06:26

## 2023-03-18 RX ADMIN — AMITRIPTYLINE HYDROCHLORIDE 75 MG: 50 TABLET, FILM COATED ORAL at 20:29

## 2023-03-18 RX ADMIN — SENNOSIDES 8.6 MG: 8.6 TABLET, COATED ORAL at 09:14

## 2023-03-18 RX ADMIN — CITALOPRAM HYDROBROMIDE 20 MG: 20 TABLET ORAL at 09:14

## 2023-03-18 ASSESSMENT — PAIN - FUNCTIONAL ASSESSMENT: PAIN_FUNCTIONAL_ASSESSMENT: ACTIVITIES ARE NOT PREVENTED

## 2023-03-18 ASSESSMENT — PAIN DESCRIPTION - ONSET: ONSET: ON-GOING

## 2023-03-18 ASSESSMENT — PAIN DESCRIPTION - LOCATION
LOCATION: LEG
LOCATION: BACK

## 2023-03-18 ASSESSMENT — PAIN SCALES - GENERAL
PAINLEVEL_OUTOF10: 6
PAINLEVEL_OUTOF10: 8
PAINLEVEL_OUTOF10: 0

## 2023-03-18 ASSESSMENT — PAIN DESCRIPTION - ORIENTATION
ORIENTATION: LOWER
ORIENTATION: LOWER

## 2023-03-18 ASSESSMENT — PAIN DESCRIPTION - DESCRIPTORS
DESCRIPTORS: DULL;ACHING
DESCRIPTORS: ACHING

## 2023-03-18 ASSESSMENT — PAIN DESCRIPTION - FREQUENCY: FREQUENCY: CONTINUOUS

## 2023-03-18 ASSESSMENT — PAIN DESCRIPTION - PAIN TYPE: TYPE: CHRONIC PAIN

## 2023-03-18 NOTE — CARE COORDINATION
BREE spoke with Pleon Camp at [facility]. Patient is long term care and can return with no pre-cert. BREE arranged transport via [address] at 1630. Please call report to [phone]. CM faxed orders to [phone]. BREE received a call from [facility] who states pt wants to appeal discharge. BREE called [address] [facility], no late transports this evening. Patient has chosen to appeal discharge, IMM signed, Detailed Notice of Discharge delivered to patient and reviewed with patient. Patient aware they must initiate appeal process with Cecilia Dandy, number provided for contact.          Cecilia Dandy Appeal Case #: [id number]             Lawson Goode RN, BSN,    Ortho/Neuro   [phone]

## 2023-03-18 NOTE — CARE COORDINATION
CM following, [place] appeal submitted in the portal.  Pending Decision.    Electronically signed by Dionte Watkins RN on 3/18/2023 at 4:57 [phone]

## 2023-03-18 NOTE — PROGRESS NOTES
Pt wanting to appeal discharge. Case management aware and brought pt paperwork to appeal. Pt reports she called the number on the paper to appeal her discharge.

## 2023-03-18 NOTE — DISCHARGE INSTR - COC
Continuity of Care Form    Patient Name: Dandre Silverman   :  1965  MRN:  [id number]    Admit date:  3/16/2023  Discharge date:  3/18/2023    Code Status Order: Full Code   Advance Directives:     Admitting Physician:  Ziggy Hernandez MD  PCP: Jimmy Brown MD    Discharging Nurse: [facility]/Room#: 1074/7837-69  Discharging Unit Phone Number: [phone]    Emergency Contact:   Extended Emergency Contact Information  Primary Emergency Contact: Bharath Anguiano  Address: [address] [place] [address]  Home Phone: [phone]  Mobile Phone: [phone]  Relation: Child  Preferred language: English  Secondary Emergency Contact: GopiMaurice  Mobile Phone: [phone]  Relation: Other  Preferred language: English    Past Surgical History:  History reviewed. No pertinent surgical history.     Immunization History:   Immunization History   Administered Date(s) Administered    COVID-19, PFIZER PURPLE top, DILUTE for use, (age 15 y+), 30mcg/0.3mL 2021, 2021, 2021       Active Problems:  Patient Active Problem List   Diagnosis Code    Syncope, near R55    Chest pain R07.9       Isolation/Infection:   Isolation            No Isolation          Patient Infection Status       None to display            Nurse Assessment:  Last Vital Signs: /63   Pulse 86   Temp 97.7 °F (36.5 °C) (Oral)   Resp 18   Ht 5' 3\" (1.6 m)   Wt (!) 319 lb 9.6 oz (145 kg)   SpO2 94%   BMI 56.61 kg/m²     Last documented pain score (0-10 scale): Pain Level: 0  Last Weight:   Wt Readings from Last 1 Encounters:   23 (!) 319 lb 9.6 oz (145 kg)     Mental Status:  oriented and alert    IV Access:  - None    Nursing Mobility/ADLs:  Walking   Dependent  Transfer  Dependent  Bathing  Assisted  Dressing  Assisted  Toileting  Assisted  Feeding  Independent  Med Admin  Assisted  Med Delivery   whole    Wound Care Documentation and Therapy:  Wound 23 Sacrum Anterior skin tear Electronically signed by MARIA Oconnell, NED on 3/20/23 at 2:41 PM EDT    PHYSICIAN SECTION    Prognosis: Good    Condition at Discharge: Stable    Rehab Potential (if transferring to Rehab): Good    Recommended Labs or Other Treatments After Discharge:       Physician Certification: I certify the above information and transfer of Aminah Celis  is necessary for the continuing treatment of the diagnosis listed and that she requires  for greater 30 days.      Update Admission H&P: No change in H&P    PHYSICIAN SIGNATURE:  Electronically signed by Mandy France MD on 3/18/23 at 12:11 PM EDT

## 2023-03-18 NOTE — PLAN OF CARE
Problem: Discharge Planning  Goal: Discharge to home or other facility with appropriate resources  Outcome: Progressing  Flowsheets (Taken 3/18/2023 1712)  Discharge to home or other facility with appropriate resources: Identify barriers to discharge with patient and caregiver  Note: She is refusing to go back to [facility]. Appealed discharge. Attempted to instruct pt that she will need to transfer back to [facility] and if she wants to transfer facilities, she will need to discuss with them. Problem: Skin/Tissue Integrity  Goal: Absence of new skin breakdown  Description: 1. Monitor for areas of redness and/or skin breakdown  2. Assess vascular access sites hourly  3. Every 4-6 hours minimum:  Change oxygen saturation probe site  4. Every 4-6 hours:  If on nasal continuous positive airway pressure, respiratory therapy assess nares and determine need for appliance change or resting period. 3/18/2023 1712 by Nicholas Beck RN  Outcome: Progressing  Note: She has a tear to her sacrum. Mepilex in place. Purewick for incontinence. Problem: Safety - Adult  Goal: Free from fall injury  3/18/2023 1712 by Nicholas Beck RN  Outcome: Progressing  Flowsheets (Taken 3/18/2023 1712)  Free From Fall Injury:   Hazeline Organ family/caregiver on patient safety   Based on caregiver fall risk screen, instruct family/caregiver to ask for assistance with transferring infant if caregiver noted to have fall risk factors  Note: Pt is a fall risk. See [place] Head Fall Score. Pt bed is in low position, side rails up, call light and belongings are in reach. Bed alarm is on. Pt encouraged to call for assistance. Will continue with hourly rounds for po intake, pain needs, toileting and repositioning as needed.  Will continue to monitor for needs

## 2023-03-18 NOTE — DISCHARGE SUMMARY
Hospital Medicine Discharge Summary    Patient ID: Annette Pena      Patient's PCP: Payal Harrison MD    Admit Date: 3/16/2023     Discharge Date:   3/18/23  Admitting Physician: Patience Tyson MD     Discharge Physician: Kristin Bertrand MD     Discharge Diagnoses: Active Hospital Problems    Diagnosis     Chest pain [R07.9]      Priority: Medium       The patient was seen and examined on day of discharge and this discharge summary is in conjunction with any daily progress note from day of discharge. Hospital Course:     Annette Pena is a 65 y.o. patient who presented to the hospital with complaints of chest pain yesterday that has resolved with negative troponin x 3. Pt has hx of CVA. Impression:  atypical chest pain with no chest pain today. Recommendations:     I had the opportunity to review the clinical symptoms and presentation of Annette Pena. I recommend that the patient undergo no further cardiac evaluation given 3 negative troponins and no evolutionary ECG changes and no further chest pain on exam this am.    3/18 : clinically stable, denies chest pain or sob  Vitals stable    Dc today  Case management consulted for safe discharge       Physical Exam Performed:     /63   Pulse 86   Temp 97.7 °F (36.5 °C) (Oral)   Resp 18   Ht 5' 3\" (1.6 m)   Wt (!) 319 lb 9.6 oz (145 kg)   SpO2 94%   BMI 56.61 kg/m²       General appearance:  No apparent distress, appears stated age and cooperative. HEENT:  Normal cephalic, atraumatic without obvious deformity. Pupils equal, round, and reactive to light. Extra ocular muscles intact. Conjunctivae/corneas clear. Neck: Supple, with full range of motion. No jugular venous distention. Trachea midline. Respiratory:  Normal respiratory effort. Clear to auscultation, bilaterally without Rales/Wheezes/Rhonchi. Cardiovascular:  Regular rate and rhythm with normal S1/S2 without murmurs, rubs or gallops.   Abdomen: Soft, [phone].

## 2023-03-18 NOTE — PLAN OF CARE
Problem: Chronic Conditions and Co-morbidities  Goal: Patient's chronic conditions and co-morbidity symptoms are monitored and maintained or improved  Outcome: Progressing     Problem: Pain  Goal: Verbalizes/displays adequate comfort level or baseline comfort level  3/18/2023 0424 by Marilee Khan RN  Outcome: Progressing: Patient on PRN morphine 2mg for pain. Problem: Skin/Tissue Integrity  Goal: Absence of new skin breakdown  Description: 1. Monitor for areas of redness and/or skin breakdown  2. Assess vascular access sites hourly  3. Every 4-6 hours minimum:  Change oxygen saturation probe site  4. Every 4-6 hours:  If on nasal continuous positive airway pressure, respiratory therapy assess nares and determine need for appliance change or resting period.   3/18/2023 0424 by Marilee Khan RN  Outcome: Progressing

## 2023-03-19 LAB
TROPONIN T SERPL-MCNC: <0.01 NG/ML

## 2023-03-19 PROCEDURE — 36415 COLL VENOUS BLD VENIPUNCTURE: CPT

## 2023-03-19 PROCEDURE — 97162 PT EVAL MOD COMPLEX 30 MIN: CPT

## 2023-03-19 PROCEDURE — 97530 THERAPEUTIC ACTIVITIES: CPT

## 2023-03-19 PROCEDURE — 84484 ASSAY OF TROPONIN QUANT: CPT

## 2023-03-19 PROCEDURE — 6360000002 HC RX W HCPCS: Performed by: INTERNAL MEDICINE

## 2023-03-19 PROCEDURE — 2580000003 HC RX 258: Performed by: INTERNAL MEDICINE

## 2023-03-19 PROCEDURE — 6370000000 HC RX 637 (ALT 250 FOR IP): Performed by: HOSPITALIST

## 2023-03-19 PROCEDURE — 6370000000 HC RX 637 (ALT 250 FOR IP): Performed by: INTERNAL MEDICINE

## 2023-03-19 PROCEDURE — 1200000000 HC SEMI PRIVATE

## 2023-03-19 RX ADMIN — ACETAMINOPHEN 650 MG: 325 TABLET ORAL at 20:59

## 2023-03-19 RX ADMIN — SODIUM CHLORIDE, PRESERVATIVE FREE 10 ML: 5 INJECTION INTRAVENOUS at 21:05

## 2023-03-19 RX ADMIN — ALPRAZOLAM 2 MG: 0.5 TABLET ORAL at 05:49

## 2023-03-19 RX ADMIN — ENOXAPARIN SODIUM 30 MG: 100 INJECTION SUBCUTANEOUS at 21:00

## 2023-03-19 RX ADMIN — ALPRAZOLAM 2 MG: 0.5 TABLET ORAL at 21:00

## 2023-03-19 RX ADMIN — Medication 10 MG: at 20:58

## 2023-03-19 RX ADMIN — ASPIRIN 81 MG 81 MG: 81 TABLET ORAL at 10:35

## 2023-03-19 RX ADMIN — SODIUM CHLORIDE, PRESERVATIVE FREE 10 ML: 5 INJECTION INTRAVENOUS at 10:37

## 2023-03-19 RX ADMIN — BUSPIRONE HYDROCHLORIDE 5 MG: 5 TABLET ORAL at 10:35

## 2023-03-19 RX ADMIN — DESMOPRESSIN ACETATE 250 MCG: 0.2 TABLET ORAL at 21:20

## 2023-03-19 RX ADMIN — CITALOPRAM HYDROBROMIDE 20 MG: 20 TABLET ORAL at 10:35

## 2023-03-19 RX ADMIN — ENOXAPARIN SODIUM 30 MG: 100 INJECTION SUBCUTANEOUS at 10:36

## 2023-03-19 RX ADMIN — ALPRAZOLAM 2 MG: 0.5 TABLET ORAL at 12:32

## 2023-03-19 RX ADMIN — AMITRIPTYLINE HYDROCHLORIDE 75 MG: 50 TABLET, FILM COATED ORAL at 20:58

## 2023-03-19 RX ADMIN — BUSPIRONE HYDROCHLORIDE 5 MG: 5 TABLET ORAL at 20:59

## 2023-03-19 RX ADMIN — LEVOTHYROXINE SODIUM 125 MCG: 0.12 TABLET ORAL at 05:49

## 2023-03-19 ASSESSMENT — PAIN DESCRIPTION - DESCRIPTORS
DESCRIPTORS: ACHING
DESCRIPTORS: ACHING

## 2023-03-19 ASSESSMENT — PAIN DESCRIPTION - LOCATION
LOCATION: ABDOMEN
LOCATION: ABDOMEN

## 2023-03-19 ASSESSMENT — PAIN SCALES - GENERAL
PAINLEVEL_OUTOF10: 0
PAINLEVEL_OUTOF10: 0
PAINLEVEL_OUTOF10: 7
PAINLEVEL_OUTOF10: 0
PAINLEVEL_OUTOF10: 6

## 2023-03-19 ASSESSMENT — PAIN DESCRIPTION - ORIENTATION
ORIENTATION: ANTERIOR;MID
ORIENTATION: ANTERIOR

## 2023-03-19 ASSESSMENT — PAIN - FUNCTIONAL ASSESSMENT
PAIN_FUNCTIONAL_ASSESSMENT: ACTIVITIES ARE NOT PREVENTED
PAIN_FUNCTIONAL_ASSESSMENT: ACTIVITIES ARE NOT PREVENTED

## 2023-03-19 ASSESSMENT — PAIN DESCRIPTION - PAIN TYPE: TYPE: ACUTE PAIN

## 2023-03-19 NOTE — PROGRESS NOTES
Education  Patient Education  Education Given To: Patient  Education Provided: Role of Therapy;Transfer Training;Equipment;Plan of Care;Precautions;Orientation; Fall Prevention Strategies  Education Method: Verbal  Barriers to Learning: Cognition  Education Outcome: Continued education needed      Therapy Time   Individual Concurrent Group Co-treatment   Time In  7548         Time Out  7379         Minutes  40             Total [facility]

## 2023-03-19 NOTE — PROGRESS NOTES
Hospitalist Progress Note      PCP: Belkys Spears MD    Date of Admission: 3/16/2023    Chief Complaint:  chest pain      Subjective:     AAO x 3  Not in acute distress  No sob or chest pain  Awaiting placement      Medications:  Reviewed    Infusion Medications    sodium chloride       Scheduled Medications    sodium chloride flush  5-40 mL IntraVENous 2 times per day    enoxaparin  30 mg SubCUTAneous BID    ALPRAZolam  2 mg Oral q8h    amitriptyline  75 mg Oral Nightly    aspirin  81 mg Oral Daily    busPIRone  5 mg Oral BID    citalopram  20 mg Oral Daily    melatonin  10 mg Oral Nightly    senna  1 tablet Oral Daily    levothyroxine  125 mcg Oral Daily    desmopressin  250 mcg Oral Nightly     PRN Meds: sodium chloride flush, sodium chloride, acetaminophen **OR** acetaminophen, polyethylene glycol, nitroGLYCERIN, ipratropium-albuterol      Intake/Output Summary (Last 24 hours) at 3/19/2023 1420  Last data filed at 3/19/2023 0550  Gross per 24 hour   Intake 1200 ml   Output 450 ml   Net 750 ml       Physical Exam Performed:    /77   Pulse 94   Temp 97.4 °F (36.3 °C) (Oral)   Resp 19   Ht 5' 3\" (1.6 m)   Wt (!) 321 lb (145.6 kg)   SpO2 95%   BMI 56.86 kg/m²     General appearance: No apparent distress, appears stated age and cooperative. HEENT: Pupils equal, round, and reactive to light. Conjunctivae/corneas clear. Neck: Supple, with full range of motion. No jugular venous distention. Trachea midline. Respiratory:  Normal respiratory effort. Clear to auscultation, bilaterally without Rales/Wheezes/Rhonchi. Cardiovascular: Regular rate and rhythm with normal S1/S2 without murmurs, rubs or gallops. Abdomen: Soft, non-tender, non-distended with normal bowel sounds. Musculoskeletal: No clubbing, cyanosis or edema bilaterally. Full range of motion without deformity. Skin: Skin color, texture, turgor normal.  No rashes or lesions.   Neurologic:  Neurovascularly intact without any focal

## 2023-03-19 NOTE — PROGRESS NOTES
Hospitalist Progress Note      PCP: Brittney Mendosa MD    Date of Admission: 3/16/2023    Chief Complaint: [facility] [place] [facility] Course: ***     Subjective: ***       Medications:  Reviewed    Infusion Medications    sodium chloride       Scheduled Medications    sodium chloride flush  5-40 mL IntraVENous 2 times per day    enoxaparin  30 mg SubCUTAneous BID    ALPRAZolam  2 mg Oral q8h    amitriptyline  75 mg Oral Nightly    aspirin  81 mg Oral Daily    busPIRone  5 mg Oral BID    citalopram  20 mg Oral Daily    melatonin  10 mg Oral Nightly    senna  1 tablet Oral Daily    levothyroxine  125 mcg Oral Daily    desmopressin  250 mcg Oral Nightly     PRN Meds: sodium chloride flush, sodium chloride, acetaminophen **OR** acetaminophen, polyethylene glycol, nitroGLYCERIN, ipratropium-albuterol      Intake/Output Summary (Last 24 hours) at 3/19/2023 1005  Last data filed at 3/19/2023 0550  Gross per 24 hour   Intake 1200 ml   Output 450 ml   Net 750 ml       Physical Exam Performed:    /68   Pulse (!) 104   Temp 97.6 °F (36.4 °C) (Axillary)   Resp 20   Ht 5' 3\" (1.6 m)   Wt (!) 321 lb (145.6 kg)   SpO2 96%   BMI 56.86 kg/m²     General appearance: No apparent distress, appears stated age and cooperative. HEENT: Pupils equal, round, and reactive to light. Conjunctivae/corneas clear. Neck: Supple, with full range of motion. No jugular venous distention. Trachea midline. Respiratory:  Normal respiratory effort. Clear to auscultation, bilaterally without Rales/Wheezes/Rhonchi. Cardiovascular: Regular rate and rhythm with normal S1/S2 without murmurs, rubs or gallops. Abdomen: Soft, non-tender, non-distended with normal bowel sounds. Musculoskeletal: No clubbing, cyanosis or edema bilaterally. Full range of motion without deformity. Skin: Skin color, texture, turgor normal.  No rashes or lesions. Neurologic:  Neurovascularly intact without any focal sensory/motor deficits.  Cranial nerves: II-XII intact, grossly non-focal.  Psychiatric: Alert and oriented, thought content appropriate, normal insight  Capillary Refill: Brisk,< 3 seconds   Peripheral Pulses: +2 palpable, equal bilaterally       Labs:   Recent Labs     03/16/23 2257   WBC 5.0   HGB 10.9*   HCT 33.6*        Recent Labs     03/16/23 2257      K 3.8      CO2 28   BUN 6*   CREATININE <0.5*   CALCIUM 8.6     No results for input(s): AST, ALT, BILIDIR, BILITOT, ALKPHOS in the last 72 hours. No results for input(s): INR in the last 72 hours. Recent Labs     03/18/23  0842 03/19/23  0012 03/19/23  0316   TROPONINI <0.01 <0.01 <0.01       Urinalysis:    No results found for: [place], BACTERIA, RBCUA, BLOODU, SPECGRAV, GLUCOSEU    Radiology:  XR CHEST PORTABLE   Final Result      Low lung volumes. Pulmonary vascular congestion. No focal consolidation. Assessment/Plan:    Active Hospital Problems    Diagnosis     Chest pain [R07.9]      Priority: Medium         DVT Prophylaxis: ***  Diet: ADULT DIET;  Regular  Code Status: Full Code    PT/OT Eval Status: ***    Dispo - ***    Camden Padgett MD

## 2023-03-19 NOTE — PLAN OF CARE
Problem: Chronic Conditions and Co-morbidities  Goal: Patient's chronic conditions and co-morbidity symptoms are monitored and maintained or improved  Outcome: Progressing     Problem: Skin/Tissue Integrity  Goal: Absence of new skin breakdown  Description: 1. Monitor for areas of redness and/or skin breakdown  2. Assess vascular access sites hourly  3. Every 4-6 hours minimum:  Change oxygen saturation probe site  4. Every 4-6 hours:  If on nasal continuous positive airway pressure, respiratory therapy assess nares and determine need for appliance change or resting period. Outcome: Progressing  Note: Patient has a skin tear to her sacrum. Mepilex in place. Problem: Safety - Adult  Goal: Free from fall injury  Outcome: Progressing  Flowsheets (Taken 3/18/2023 1712)  Free From Fall Injury:   Instruct family/caregiver on patient safety   Based on caregiver fall risk screen, instruct family/caregiver to ask for assistance with transferring infant if caregiver noted to have fall risk factors  Note: Pt is a high fall risk. See Catina Graves Fall Score. bed in low position, side rails up, call light and belongings are in reach. Bed alarm on. Pt encouraged to call for assistance. Will continue with hourly roundings for po intake, pain needs, toileting and repositioning as needed. Will continue to monitor for needs    Problem: Discharge Planning  Goal: Discharge to home or other facility with appropriate resources  Outcome: Progressing  Flowsheets (Taken 3/18/2023 1712)  Discharge to home or other facility with appropriate resources: Identify barriers to discharge with patient and caregiver  Note: Patient discharged yesterday but refusing to go back to [facility], wants a new Nursing home. Appealed the [address] [place] [address] submitted yesterday and awaiting decision. CM following up with this.

## 2023-03-19 NOTE — PROGRESS NOTES
Patient shift assessment completed, patient refusing medications at this time, states wants to rest.  Advised patient will return shortly to administer morning medications, patient verbalized understanding

## 2023-03-19 NOTE — PLAN OF CARE
Problem: Discharge Planning  Goal: Discharge to home or other facility with appropriate resources  3/19/2023 0930 by Sneha Comer RN  Outcome: Progressing  Note: Patient to return to [facility] upon discharge, patient discharge order in place, patient has appealed, awaiting response. Will need medical transport upon discharge. Problem: Skin/Tissue Integrity  Goal: Absence of new skin breakdown  Description: 1. Monitor for areas of redness and/or skin breakdown  2. Assess vascular access sites hourly  3. Every 4-6 hours minimum:  Change oxygen saturation probe site  4. Every 4-6 hours:  If on nasal continuous positive airway pressure, respiratory therapy assess nares and determine need for appliance change or resting period.   3/19/2023 0930 by Sneha Comer RN  Note: Patient skin remains intact, redness noted to abdominal folds and ebenezer area, patient educated on need for increased frequency of hygiene and to call for assistance when needing to urinate or have a bowel movement, patient verbalized understanding, however needs reinforcement     Problem: Safety - Adult  Goal: Free from fall injury  3/19/2023 0930 by Sneha Comer RN  Outcome: Progressing  Note: Patient is a high fall risk, fall precautions in place, patient remains free from falls

## 2023-03-20 PROCEDURE — 2580000003 HC RX 258: Performed by: INTERNAL MEDICINE

## 2023-03-20 PROCEDURE — 6370000000 HC RX 637 (ALT 250 FOR IP): Performed by: HOSPITALIST

## 2023-03-20 PROCEDURE — 1200000000 HC SEMI PRIVATE

## 2023-03-20 PROCEDURE — 6360000002 HC RX W HCPCS: Performed by: INTERNAL MEDICINE

## 2023-03-20 RX ORDER — BUSPIRONE HYDROCHLORIDE 5 MG/1
5 TABLET ORAL 2 TIMES DAILY
Qty: 60 TABLET | Refills: 0 | Status: SHIPPED | OUTPATIENT
Start: 2023-03-20 | End: 2023-04-19

## 2023-03-20 RX ORDER — MELATONIN 10 MG
10 CAPSULE ORAL NIGHTLY
Qty: 30 CAPSULE | Refills: 1 | Status: SHIPPED | OUTPATIENT
Start: 2023-03-20

## 2023-03-20 RX ORDER — DESMOPRESSIN ACETATE 0.1 MG/ML
1 SOLUTION NASAL DAILY
Qty: 30 EACH | Refills: 3 | Status: SHIPPED | OUTPATIENT
Start: 2023-03-20

## 2023-03-20 RX ORDER — AMITRIPTYLINE HYDROCHLORIDE 75 MG/1
75 TABLET, FILM COATED ORAL NIGHTLY
Qty: 30 TABLET | Refills: 2 | Status: SHIPPED | OUTPATIENT
Start: 2023-03-20

## 2023-03-20 RX ORDER — LEVOTHYROXINE SODIUM 0.12 MG/1
125 TABLET ORAL DAILY
Qty: 30 TABLET | Refills: 2 | Status: SHIPPED | OUTPATIENT
Start: 2023-03-20

## 2023-03-20 RX ORDER — ALPRAZOLAM 1 MG/1
2 TABLET ORAL 3 TIMES DAILY PRN
Qty: 9 TABLET | Refills: 0 | Status: SHIPPED | OUTPATIENT
Start: 2023-03-20 | End: 2023-03-23

## 2023-03-20 RX ORDER — ASPIRIN 81 MG/1
81 TABLET, CHEWABLE ORAL DAILY
Qty: 30 TABLET | Refills: 2 | Status: SHIPPED | OUTPATIENT
Start: 2023-03-20

## 2023-03-20 RX ORDER — SENNA PLUS 8.6 MG/1
1 TABLET ORAL DAILY
Qty: 30 TABLET | Refills: 1 | Status: SHIPPED | OUTPATIENT
Start: 2023-03-20

## 2023-03-20 RX ADMIN — SODIUM CHLORIDE, PRESERVATIVE FREE 10 ML: 5 INJECTION INTRAVENOUS at 08:47

## 2023-03-20 RX ADMIN — DESMOPRESSIN ACETATE 250 MCG: 0.2 TABLET ORAL at 21:16

## 2023-03-20 RX ADMIN — AMITRIPTYLINE HYDROCHLORIDE 75 MG: 50 TABLET, FILM COATED ORAL at 21:18

## 2023-03-20 RX ADMIN — ALPRAZOLAM 2 MG: 0.5 TABLET ORAL at 21:21

## 2023-03-20 RX ADMIN — ENOXAPARIN SODIUM 30 MG: 100 INJECTION SUBCUTANEOUS at 21:21

## 2023-03-20 RX ADMIN — ALPRAZOLAM 2 MG: 0.5 TABLET ORAL at 05:15

## 2023-03-20 RX ADMIN — Medication 10 MG: at 21:17

## 2023-03-20 RX ADMIN — ENOXAPARIN SODIUM 30 MG: 100 INJECTION SUBCUTANEOUS at 08:46

## 2023-03-20 RX ADMIN — BUSPIRONE HYDROCHLORIDE 5 MG: 5 TABLET ORAL at 08:46

## 2023-03-20 RX ADMIN — ASPIRIN 81 MG 81 MG: 81 TABLET ORAL at 08:46

## 2023-03-20 RX ADMIN — ALPRAZOLAM 2 MG: 0.5 TABLET ORAL at 12:49

## 2023-03-20 RX ADMIN — CITALOPRAM HYDROBROMIDE 20 MG: 20 TABLET ORAL at 08:47

## 2023-03-20 RX ADMIN — BUSPIRONE HYDROCHLORIDE 5 MG: 5 TABLET ORAL at 21:17

## 2023-03-20 RX ADMIN — LEVOTHYROXINE SODIUM 125 MCG: 0.12 TABLET ORAL at 05:15

## 2023-03-20 ASSESSMENT — PAIN SCALES - GENERAL
PAINLEVEL_OUTOF10: 5
PAINLEVEL_OUTOF10: 0

## 2023-03-20 NOTE — PROGRESS NOTES
Pt and I both talked to her son Pavan Longo. He states he will talk to his father at 3:30 today and arrange for them to come pick her up. Pt refuses to go back to [facility].
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
## 2023-03-20 NOTE — PLAN OF CARE
Problem: Discharge Planning  Goal: Discharge to home or other facility with appropriate resources  3/19/2023 2243 by Jane Pedroza, JORGE  Outcome: Progressing  Note: Pt will return to [facility], Discharge order has been placed and awaiting response. Problem: Skin/Tissue Integrity  Goal: Absence of new skin breakdown  Description: 1. Monitor for areas of redness and/or skin breakdown  2. Assess vascular access sites hourly  3. Every 4-6 hours minimum:  Change oxygen saturation probe site  4. Every 4-6 hours:  If on nasal continuous positive airway pressure, respiratory therapy assess nares and determine need for appliance change or resting period. 3/19/2023 2243 by Jane Pedroza, JORGE  Outcome: Progressing  Note: Skin is intact, pt do have some redness to the abdominal folds and perineal area, adequate self hygiene encouraged. Problem: Safety - Adult  Goal: Free from fall injury  3/19/2023 2243 by Jane Pedroza, JORGE  Outcome: Progressing  Note: Pt is a fall risk and fall precautions observed, bed at the lowest position,bed alarm turned on, call light and belongings within reach and gripper socks on.

## 2023-03-20 NOTE — PROGRESS NOTES
Attempted to set up transport for later this evening in case patients family doesn't show up to pick her up. [facility] states they are booked until early morning and can not book any more runs until the crews get caught up. RN supervisor made aware.
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
## 2023-03-20 NOTE — PROGRESS NOTES
sensory/motor deficits. Cranial nerves: II-XII intact, grossly non-focal.  Psychiatric: Alert and oriented, thought content appropriate, normal insight  Capillary Refill: Brisk,< 3 seconds   Peripheral Pulses: +2 palpable, equal bilaterally       Labs:   No results for input(s): WBC, HGB, HCT, PLT in the last 72 hours. No results for input(s): NA, K, CL, CO2, BUN, CREATININE, CALCIUM, PHOS in the last 72 hours. Invalid input(s): MAGNES    No results for input(s): AST, ALT, BILIDIR, BILITOT, ALKPHOS in the last 72 hours. No results for input(s): INR in the last 72 hours. Recent Labs     03/19/23  0012 03/19/23  0316 03/19/23  1111   TROPONINI <0.01 <0.01 <0.01         Urinalysis:    No results found for: Laddie Braga, BACTERIA, RBCUA, BLOODU, SPECGRAV, GLUCOSEU    Radiology:  XR CHEST PORTABLE   Final Result      Low lung volumes. Pulmonary vascular congestion. No focal consolidation. Assessment/Plan:    Active Hospital Problems    Diagnosis     Chest pain [R07.9]      Priority: Medium   Chest pain      H/o CVA     Hypothyroidism     Anxiety DO     H/o DI ? Plan     Awaiting placement , discussed with case management    ACS ruled out  No further testing as per cardiology   medically stable for discharge     Continue home meds for  DI   Continue home meds for anxiety and depression  Continue synthroid      DVT Prophylaxis: lovenox       Code Status: Full Code             DVT Prophylaxis:   Diet: ADULT DIET;  Regular  Code Status: Full Code    DC plan : medically stable  Awaiting placement    Dorothy Anna MD
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
## 2023-03-21 PROCEDURE — 6360000002 HC RX W HCPCS: Performed by: INTERNAL MEDICINE

## 2023-03-21 PROCEDURE — 6370000000 HC RX 637 (ALT 250 FOR IP): Performed by: HOSPITALIST

## 2023-03-21 PROCEDURE — 1200000000 HC SEMI PRIVATE

## 2023-03-21 RX ADMIN — AMITRIPTYLINE HYDROCHLORIDE 75 MG: 50 TABLET, FILM COATED ORAL at 20:54

## 2023-03-21 RX ADMIN — ALPRAZOLAM 2 MG: 0.5 TABLET ORAL at 12:46

## 2023-03-21 RX ADMIN — ASPIRIN 81 MG 81 MG: 81 TABLET ORAL at 10:04

## 2023-03-21 RX ADMIN — BUSPIRONE HYDROCHLORIDE 5 MG: 5 TABLET ORAL at 10:04

## 2023-03-21 RX ADMIN — LEVOTHYROXINE SODIUM 125 MCG: 0.12 TABLET ORAL at 05:37

## 2023-03-21 RX ADMIN — Medication 10 MG: at 20:54

## 2023-03-21 RX ADMIN — CITALOPRAM HYDROBROMIDE 20 MG: 20 TABLET ORAL at 10:04

## 2023-03-21 RX ADMIN — ALPRAZOLAM 2 MG: 0.5 TABLET ORAL at 20:52

## 2023-03-21 RX ADMIN — DESMOPRESSIN ACETATE 250 MCG: 0.2 TABLET ORAL at 20:52

## 2023-03-21 RX ADMIN — ALPRAZOLAM 2 MG: 0.5 TABLET ORAL at 05:37

## 2023-03-21 RX ADMIN — BUSPIRONE HYDROCHLORIDE 5 MG: 5 TABLET ORAL at 20:54

## 2023-03-21 RX ADMIN — ENOXAPARIN SODIUM 30 MG: 100 INJECTION SUBCUTANEOUS at 20:52

## 2023-03-21 ASSESSMENT — PAIN SCALES - GENERAL
PAINLEVEL_OUTOF10: 0
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
## 2023-03-21 NOTE — PROGRESS NOTES
Noticed that pt has no IV access since 03/16/23. Asked pt if she want me to insert an access but pt refused. Pt is for dc since Fri. She verbalized that she will be dc in AM to her ex 's house, address given to this nurse.
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
## 2023-03-21 NOTE — PROGRESS NOTES
GIM On Call    Pxt reportedly very combative, rude, racist and delusional, and hence could not be safely transferred to SNF as per plan.     Will need to be re-evaluated in AM    Reviewed H and P and current documentation re hx of similar behavior     Consider Psych input      Gamal Garg MD

## 2023-03-21 NOTE — PROGRESS NOTES
Spoke with patient regarding discharge, states she needs to speak with her sister Laura to let her know what is going on. Advised patient we need to develop discharge plan this am, patient verbalized understanding. Spoke with patients son Katrin Chirinos who stated he agrees patient is to go to her ex husbands house and not to return to [facility]. Placed call to patients ex  Jose Mann at [phone] and left message to confirm agreement for her to be discharged to his home and confirmation of address. Awaiting response.

## 2023-03-21 NOTE — PLAN OF CARE
Problem: Discharge Planning  Goal: Discharge to home or other facility with appropriate resources  Outcome: Progressing  Flowsheets (Taken 3/20/2023 2214)  Discharge to home or other facility with appropriate resources:   Identify barriers to discharge with patient and caregiver   Arrange for needed discharge resources and transportation as appropriate   Identify discharge learning needs (meds, wound care, etc)  Note: Pt had a dc order since Friday but refused to go to [facility]. She verbalized that she made an arrangement to be dc to to her ex 's home n[place] am, just tell her what time she needs to be dc. Problem: Safety - Adult  Goal: Free from fall injury  Outcome: Progressing  Note: Call lights within reach, on special bed with alarms on and at lowest position.
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
## 2023-03-21 NOTE — PROGRESS NOTES
Pt had a dc order since Friday but refused to go to [facility]. She verbalized to this nurse that she made an arrangement to be dc to her ex 's home [place]. I asked the contact number of ex  to confirm the address but pt is hesitant as she claimed that ex  is \"nasty\" and Dahlia Quintana will be home anyway and he knows that she is coming to his house\". Explained to her that we need to talk to someone who can confirm to us if the address that she gave is correct before we make a transport arrangement, advised her to tell us if we can call any of her family members to confirm, like her son for example. She said she will inform the nurse in AM as it is too early to call her son. Dr. Solorzano Adjutant noted about pt's behavior and may not be safely transferred to SNF as per plan. For Psyche input in AM. Call lights within reach, bed at the lowest position with bed alarm on and wheels locked.      Address of ex :  [address] [place] [id number]

## 2023-03-21 NOTE — PLAN OF CARE
Problem: Pain  Goal: Verbalizes/displays adequate comfort level or baseline comfort level  Outcome: Progressing  Note: Patient denied pain throughout shift     Problem: Skin/Tissue Integrity  Goal: Absence of new skin breakdown  Description: 1. Monitor for areas of redness and/or skin breakdown  2. Assess vascular access sites hourly  3. Every 4-6 hours minimum:  Change oxygen saturation probe site  4. Every 4-6 hours:  If on nasal continuous positive airway pressure, respiratory therapy assess nares and determine need for appliance change or resting period. Outcome: Progressing  Note: Wounds cleansed with saline, zinc paste applied per protocol, patient educated on need for frequent turning and repositioning to maintain skin integrity, patient verbalized understanding     Problem: Safety - Adult  Goal: Free from fall injury  Outcome: Progressing  Note: Patient is a medium fall risk, however unable to stand/tolerate weight to walk, wheelchair at baseline, fall risk precautions in place, patient remains free from falls.

## 2023-03-21 NOTE — PROGRESS NOTES
Hospitalist Progress Note      PCP: Padmini Villanueva MD    Date of Admission: 3/16/2023    Chief Complaint:  chest pain      Subjective:     Refusing to leave this hospital   AAO x 3  Not in acute distress  No sob or chest pain  Awaiting placement      Medications:  Reviewed    Infusion Medications    sodium chloride       Scheduled Medications    sodium chloride flush  5-40 mL IntraVENous 2 times per day    enoxaparin  30 mg SubCUTAneous BID    ALPRAZolam  2 mg Oral q8h    amitriptyline  75 mg Oral Nightly    aspirin  81 mg Oral Daily    busPIRone  5 mg Oral BID    citalopram  20 mg Oral Daily    melatonin  10 mg Oral Nightly    senna  1 tablet Oral Daily    levothyroxine  125 mcg Oral Daily    desmopressin  250 mcg Oral Nightly     PRN Meds: sodium chloride flush, sodium chloride, acetaminophen **OR** acetaminophen, polyethylene glycol, nitroGLYCERIN, ipratropium-albuterol      Intake/Output Summary (Last 24 hours) at 3/21/2023 1456  Last data filed at 3/21/2023 0537  Gross per 24 hour   Intake 350 ml   Output 1650 ml   Net -1300 ml         Physical Exam Performed:    /89   Pulse 95   Temp 97.8 °F (36.6 °C) (Oral)   Resp 18   Ht 5' 3\" (1.6 m)   Wt (!) 321 lb (145.6 kg)   SpO2 93%   BMI 56.86 kg/m²     General appearance: No apparent distress, appears stated age and cooperative. HEENT: Pupils equal, round, and reactive to light. Conjunctivae/corneas clear. Neck: Supple, with full range of motion. No jugular venous distention. Trachea midline. Respiratory:  Normal respiratory effort. Clear to auscultation, bilaterally without Rales/Wheezes/Rhonchi. Cardiovascular: Regular rate and rhythm with normal S1/S2 without murmurs, rubs or gallops. Abdomen: Soft, non-tender, non-distended with normal bowel sounds. Musculoskeletal: No clubbing, cyanosis or edema bilaterally. Full range of motion without deformity. Skin: Skin color, texture, turgor normal.  No rashes or lesions.   Neurologic: Neurovascularly intact without any focal sensory/motor deficits. Cranial nerves: II-XII intact, grossly non-focal.  Psychiatric: Alert and oriented, thought content appropriate, normal insight  Capillary Refill: Brisk,< 3 seconds   Peripheral Pulses: +2 palpable, equal bilaterally       Labs:   No results for input(s): WBC, HGB, HCT, PLT in the last 72 hours. No results for input(s): NA, K, CL, CO2, BUN, CREATININE, CALCIUM, PHOS in the last 72 hours. Invalid input(s): MAGNES    No results for input(s): AST, ALT, BILIDIR, BILITOT, ALKPHOS in the last 72 hours. No results for input(s): INR in the last 72 hours. Recent Labs     03/19/23  0012 03/19/23  0316 03/19/23  1111   TROPONINI <0.01 <0.01 <0.01         Urinalysis:    No results found for: Kadeem Castles, BACTERIA, RBCUA, BLOODU, SPECGRAV, GLUCOSEU    Radiology:  XR CHEST PORTABLE   Final Result      Low lung volumes. Pulmonary vascular congestion. No focal consolidation. Assessment/Plan:    Active Hospital Problems    Diagnosis     Chest pain [R07.9]      Priority: Medium   Chest pain      H/o CVA     Hypothyroidism     Anxiety DO     H/o DI ? Plan    Psychiatry consultation placed today      Awaiting placement , , patient refused to leave this hospital and refused to go back to facility she came from ,  discussed with case management    ACS ruled out  No further testing as per cardiology   medically stable for discharge     Continue home meds for  DI   Continue home meds for anxiety and depression  Continue synthroid      DVT Prophylaxis: lovenox       Code Status: Full Code             DVT Prophylaxis:   Diet: ADULT DIET;  Regular  Code Status: Full Code    DC plan : medically stable  Awaiting placement  Refused to leave this hospital   Psychiatry consult placed, awaiting evaluation    Shiraz Israel MD

## 2023-03-22 VITALS
HEIGHT: 63 IN | TEMPERATURE: 97.8 F | OXYGEN SATURATION: 98 % | WEIGHT: 293 LBS | BODY MASS INDEX: 51.91 KG/M2 | DIASTOLIC BLOOD PRESSURE: 72 MMHG | SYSTOLIC BLOOD PRESSURE: 110 MMHG | HEART RATE: 92 BPM | RESPIRATION RATE: 18 BRPM

## 2023-03-22 PROCEDURE — 6370000000 HC RX 637 (ALT 250 FOR IP): Performed by: HOSPITALIST

## 2023-03-22 PROCEDURE — 90792 PSYCH DIAG EVAL W/MED SRVCS: CPT | Performed by: NURSE PRACTITIONER

## 2023-03-22 PROCEDURE — 6360000002 HC RX W HCPCS: Performed by: INTERNAL MEDICINE

## 2023-03-22 RX ORDER — AMITRIPTYLINE HYDROCHLORIDE 50 MG/1
50 TABLET, FILM COATED ORAL NIGHTLY
Status: DISCONTINUED | OUTPATIENT
Start: 2023-03-22 | End: 2023-03-22 | Stop reason: HOSPADM

## 2023-03-22 RX ORDER — LORAZEPAM 1 MG/1
1 TABLET ORAL
Status: COMPLETED | OUTPATIENT
Start: 2023-03-22 | End: 2023-03-22

## 2023-03-22 RX ADMIN — CITALOPRAM HYDROBROMIDE 20 MG: 20 TABLET ORAL at 08:42

## 2023-03-22 RX ADMIN — LORAZEPAM 1 MG: 1 TABLET ORAL at 15:30

## 2023-03-22 RX ADMIN — ENOXAPARIN SODIUM 30 MG: 100 INJECTION SUBCUTANEOUS at 08:42

## 2023-03-22 RX ADMIN — ASPIRIN 81 MG 81 MG: 81 TABLET ORAL at 08:42

## 2023-03-22 RX ADMIN — SENNOSIDES 8.6 MG: 8.6 TABLET, COATED ORAL at 08:42

## 2023-03-22 RX ADMIN — LEVOTHYROXINE SODIUM 125 MCG: 0.12 TABLET ORAL at 06:00

## 2023-03-22 RX ADMIN — ALPRAZOLAM 2 MG: 0.5 TABLET ORAL at 12:00

## 2023-03-22 RX ADMIN — ALPRAZOLAM 2 MG: 0.5 TABLET ORAL at 06:00

## 2023-03-22 RX ADMIN — BUSPIRONE HYDROCHLORIDE 5 MG: 5 TABLET ORAL at 08:42

## 2023-03-22 ASSESSMENT — PAIN SCALES - GENERAL
PAINLEVEL_OUTOF10: 0

## 2023-03-22 NOTE — PLAN OF CARE
Problem: Discharge Planning  Goal: Discharge to home or other facility with appropriate resources  3/22/2023 1148 by Deirdre Chavarria RN  Outcome: Completed  3/22/2023 0458 by Jennifer Jara RN  Outcome: Progressing  Flowsheets (Taken 3/22/2023 9719)  Discharge to home or other facility with appropriate resources:   Identify barriers to discharge with patient and caregiver   Arrange for needed discharge resources and transportation as appropriate   Identify discharge learning needs (meds, wound care, etc)  Note: Pt refused to be dc to [facility], awaiting Psych evaluation. Problem: Pain  Goal: Verbalizes/displays adequate comfort level or baseline comfort level  3/22/2023 1148 by Deirdre Chavarria RN  Outcome: Completed  3/22/2023 0803 by Deirdre Chavarria RN  Outcome: Progressing  Flowsheets (Taken 3/22/2023 0803)  Verbalizes/displays adequate comfort level or baseline comfort level:   Encourage patient to monitor pain and request assistance   Assess pain using appropriate pain scale   Administer analgesics based on type and severity of pain and evaluate response   Implement non-pharmacological measures as appropriate and evaluate response   Notify Licensed Independent Practitioner if interventions unsuccessful or patient reports new pain   Consider cultural and social influences on pain and pain management     Problem: Skin/Tissue Integrity  Goal: Absence of new skin breakdown  Description: 1. Monitor for areas of redness and/or skin breakdown  2. Assess vascular access sites hourly  3. Every 4-6 hours minimum:  Change oxygen saturation probe site  4. Every 4-6 hours:  If on nasal continuous positive airway pressure, respiratory therapy assess nares and determine need for appliance change or resting period.   Outcome: Completed     Problem: Safety - Adult  Goal: Free from fall injury  3/22/2023 1148 by Deirdre Chavarria RN  Outcome: Completed  3/22/2023 0803 by Deirdre Chavarria RN  Outcome:

## 2023-03-22 NOTE — PLAN OF CARE
Problem: Pain  Goal: Verbalizes/displays adequate comfort level or baseline comfort level  3/22/2023 0803 by Sabine Hobbs RN  Outcome: Progressing  Flowsheets (Taken 3/22/2023 0803)  Verbalizes/displays adequate comfort level or baseline comfort level:   Encourage patient to monitor pain and request assistance   Assess pain using appropriate pain scale   Administer analgesics based on type and severity of pain and evaluate response   Implement non-pharmacological measures as appropriate and evaluate response   Notify Licensed Independent Practitioner if interventions unsuccessful or patient reports new pain   Consider cultural and social influences on pain and pain management  3/21/2023 1829 by Yuriy Castro RN  Outcome: Progressing  Note: Patient denied pain throughout shift     Problem: Safety - Adult  Goal: Free from fall injury  3/22/2023 0803 by Sabine Hobbs RN  Outcome: [address] (Taken 3/18/2023 1712 by Joseph Germain, RN)  Free From Fall Injury:   Instruct family/caregiver on patient safety   Based on caregiver fall risk screen, instruct family/caregiver to ask for assistance with transferring infant if caregiver noted to have fall risk factors  3/22/2023 0458 by Teto Garcia RN  Outcome: Progressing  Note: Attempted to go down from the bed a few times to have \"a walk downstairs\". Tried to stand at the edge of the bed but unable to. Advised pt to stay in bed and to call for help if needed. 3/21/2023 1829 by Yuriy Castro RN  Outcome: Progressing  Note: Patient is a medium fall risk, however unable to stand/tolerate weight to walk, wheelchair at baseline, fall risk precautions in place, patient remains free from falls.
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
## 2023-03-22 NOTE — PROGRESS NOTES
Patient called [address] [place] [address] office regarding transport to facility in which she does not want to go. Sheriff KASEY Geiger arrived to unit and was present during transport with [facility] ([facility]

## 2023-03-22 NOTE — PROGRESS NOTES
Patient discharged. Transported via [facility] ([facility]). Patient has no IV access to remove.

## 2023-03-22 NOTE — DISCHARGE SUMMARY
Hospital Medicine Discharge Summary    Patient ID: Isa Cano      Patient's PCP: Lyndon Johnson MD    Admit Date: 3/16/2023     Discharge Date:   3/18/23  Admitting Physician: Ish Coffman MD     Discharge Physician: Katie Gilmore MD     Discharge Diagnoses: Active Hospital Problems    Diagnosis     Chest pain [R07.9]      Priority: Medium       The patient was seen and examined on day of discharge and this discharge summary is in conjunction with any daily progress note from day of discharge. Hospital Course:     Isa Cano is a 65 y.o. patient who presented to the hospital with complaints of chest pain yesterday that has resolved with negative troponin x 3. Pt has hx of CVA. Impression:  atypical chest pain with no chest pain today. Recommendations:     I had the opportunity to review the clinical symptoms and presentation of Isa Cano. I recommend that the patient undergo no further cardiac evaluation given 3 negative troponins and no evolutionary ECG changes and no further chest pain on exam this am.      Patient medically cleared for discharged on 3/18  Patient refused to go back to facility she came from ,  worked on issue     Psychiatry consulted : assessment as follows      Austin Valle does not require inpatient psychiatric admission at this time. She is upset that she was transferred out of [place] and is unhappy with her care at the facility in [address] [place] [address]. She wants to return to [place]. She denies current issues with depression and denies suicidal/homicidal ideation. She does not present as imminent risk to self or others. While she did express some paranoia regarding her current placement at [facility] but she did not express any hallucinations and did not appear to be responding to internal stimuli. She does not have a documented psychotic history upon chart review. Will make PRN available.        3/22 : clinically stable, denies Studies    Radiology:   XR CHEST PORTABLE   Final Result      Low lung volumes. Pulmonary vascular congestion. No focal consolidation. Consults:     IP CONSULT TO HOSPITALIST  IP CONSULT TO CARDIOLOGY  IP CONSULT TO PSYCHIATRY    Disposition:  facility     Condition at Discharge: Stable    Discharge Instructions/Follow-up:      PCP follow up in 1-2 weeks    Code Status:  Full Code     Activity: activity as tolerated    Diet: regular diet      Discharge Medications:     Current Discharge Medication List             Details   aspirin 81 MG chewable tablet Take 1 tablet by mouth daily  Qty: 30 tablet, Refills: 2      amitriptyline (ELAVIL) 75 MG tablet Take 1 tablet by mouth nightly  Qty: 30 tablet, Refills: 2      senna (SENOKOT) 8.6 MG tablet Take 1 tablet by mouth daily  Qty: 30 tablet, Refills: 1      melatonin 10 MG CAPS capsule Take 1 capsule by mouth nightly  Qty: 30 capsule, Refills: 1      desmopressin (DDAVP NASAL) 0.01 % solution 1 spray by Nasal route daily One spray in one nostril daily  (alternate nostrils)  Qty: 30 each, Refills: 3      levothyroxine (SYNTHROID) 125 MCG tablet Take 1 tablet by mouth Daily  Qty: 30 tablet, Refills: 2      busPIRone (BUSPAR) 5 MG tablet Take 1 tablet by mouth 2 times daily  Qty: 60 tablet, Refills: 0      ALPRAZolam (XANAX) 1 MG tablet Take 2 tablets by mouth 3 times daily as needed for Sleep for up to 3 days. Max Daily Amount: 6 mg  Qty: 9 tablet, Refills: 0    Associated Diagnoses: Anxiety disorder, unspecified type                Details   citalopram (CELEXA) 20 MG tablet Take 20 mg by mouth daily      IBUPROFEN PO Take 800 mg by mouth every 8 hours as needed (pain)      acetaminophen (TYLENOL) 325 MG tablet Take 650 mg by mouth every 6 hours as needed for Pain             Time Spent on discharge is more than 30 minutes in the examination, evaluation, counseling and review of medications and discharge plan.       Signed:    Anna Ellis

## 2023-03-22 NOTE — CARE COORDINATION
Case Management Assessment            Discharge Note                    Date / Time of Note: 3/22/2023 10:50 AM                  Discharge Note Completed by: MARIA Fox, LSW    Patient Name: Lani De La Cruz   YOB: 1965  Diagnosis: Chest pain [R07.9]  Chest pain, unspecified type [R07.9]   Date / Time: 3/16/2023  9:48 PM    Current PCP: Karina Arcos MD  Clinic patient: No    Hospitalization in the last 30 days: No       Advance Directives:  Code Status: Full Code  [place] DNR form completed and on chart: Yes    Financial:  Payor: Eve Morrowkonstantin / Plan: MEDICARE PART A AND B / Product Type: *No Product type* /      Pharmacy:    [facility] [address] [phone] - F [phone]  [address] [facility], [place], [facility] [address]  Phone: [phone] Fax: [phone]    Assistance purchasing medications?:    Assistance provided by Case Management: None at this time    Does patient want to participate in local refill/ meds to beds program?: Not Assessed    Meds To Beds General Rules:  1. Can ONLY be done Monday- Friday between 8:30am-5pm  2. Prescription(s) must be in pharmacy by 3pm to be filled same day  3. Copy of patient's insurance/ prescription drug card and patient face sheet must be sent along with the prescription(s)  4. Cost of Rx cannot be added to hospital bill. If financial assistance is needed, please contact unit  or ;  or  CANNOT provide pharmacy voucher for patients co-pays  5.  Patients can then  the prescription on their way out of the hospital at discharge, or pharmacy can deliver to the bedside if staff is available. (payment due at time of pick-up or delivery - cash, check, or card accepted)     Able to afford home medications/ co-pay costs: Yes    ADLS:  Current PT AM-PAC Score: 10 /24  Current OT AM-PAC Score:   /24      DISCHARGE Disposition: [facility] ([facility]): [place] Santosh Munoz  Phone: [phone]  Fax: [phone]    LOC at discharge: [place] Jessica Wilhelm Completed: Yes    Notification completed in HENS/PAS?:  Not Applicable    IMM Completed:   Yes, Case management has presented and reviewed IMM letter #2 to the patient and/or family/ POA. Patient and/or family/POA verbalized understanding of their medicare rights and appeal process if needed. Patient and/or family/POA has signed, initialed and placed today's date (3/18/23) and time (4:30pm) on letter #2 on the the appropriate lines. Patient and/or family/POA, copy of signed letter provided and they are aware that this original copy of IMM letter #2 is available prior to discharge from the paper chart on the unit. Electronic documentation has been entered into epic for IMM letter #2 and original paper copy has been added to the paper chart at the nurses station. Transportation:  Transportation PLAN for discharge: EMS transportation   Mode of Transport: Ambulance stretcher - BLS  Reason for medical transport: Bed confined: Meets the following criteria 1) unable to get out of bed without assistance or ambulate, 2) unable to safely sit up in a wheelchair, 3) unable to maintain erect seating position in a chair for time needed for transport  Name of [address]: [facility]: [phone]  Time of Transport: 4-4:30pm    Transport form completed: Yes    Home Oxygen and Respiratory Equipment:  Oxygen needed at discharge?: No    Dialysis:  Dialysis patient: No    Referrals made at [facility] for outpatient continued care:  Not Applicable    Additional CM Notes:   3/22/23   Psych assessment completed, pt does not need IP psych. Pt will DC back to [address] today; Katia in admissions confirmed being able to accept pt today, no precert needed. CM notified Micah Espinal and pt's RN of transport time. No other needs at this time. 3/21/23  Pt will DC back to [address] today.   CM notified Maria Esther Nguyen

## 2023-03-22 NOTE — PROGRESS NOTES
Awaiting for Psych evaluation before dc. Called this nurse many times during shift and wanted to be \"out of the bed to have a walk downstairs\" or \"wants to go to the toilet\". Pt unable to carry herself steadily when she tried to stand at the edge of the bed with 3 staff surrounding her. Explained to pt that she has to go back to the bed for her safety.

## 2023-03-22 NOTE — BH NOTE
Psychiatric consult complete. Chart was reviewed and patient was seen. Tracie Singleton does not require inpatient psychiatric admission at this time. She is upset that she was transferred out of [place] and is unhappy with her care at the facility in [place]. She wants to return to [place]. She denies current issues with depression and denies suicidal/homicidal ideation. She does not present as imminent risk to self or others. While she did express some paranoia regarding her current placement at [facility] but she did not express any hallucinations and did not appear to be responding to internal stimuli. She does not have a documented psychotic history upon chart review. Will make PRN available. Full psychiatric consult note to follow. Thank you for consult. Please do not hesitate to contact provider if there are additional questions regarding patient.     Shelby Ayala, MPH, PMHNP-BC  03/22/23

## 2023-03-22 NOTE — PLAN OF CARE
Problem: Discharge Planning  Goal: Discharge to home or other facility with appropriate resources  Outcome: Progressing  Flowsheets (Taken 3/22/2023 0458)  Discharge to home or other facility with appropriate resources:   Identify barriers to discharge with patient and caregiver   Arrange for needed discharge resources and transportation as appropriate   Identify discharge learning needs (meds, wound care, etc)  Note: Pt refused to be dc to [facility], awaiting Psych evaluation. Problem: Safety - Adult  Goal: Free from fall injury  3/22/2023 0458 by Dani eTrrazas RN  Outcome: Progressing  Note: Attempted to go down from the bed a few times to have \"a walk downstairs\". Tried to stand at the edge of the bed but unable to. Advised pt to stay in bed and to call for help if needed.

## 2023-03-23 ENCOUNTER — HOSPITAL ENCOUNTER (EMERGENCY)
Age: 58
Discharge: HOME | End: 2023-03-23
Attending: EMERGENCY MEDICINE
Payer: MEDICARE

## 2023-03-23 VITALS
WEIGHT: 293 LBS | RESPIRATION RATE: 18 BRPM | SYSTOLIC BLOOD PRESSURE: 149 MMHG | HEIGHT: 63 IN | TEMPERATURE: 97.7 F | HEART RATE: 97 BPM | BODY MASS INDEX: 51.91 KG/M2 | DIASTOLIC BLOOD PRESSURE: 84 MMHG | OXYGEN SATURATION: 96 %

## 2023-03-23 DIAGNOSIS — N30.00 ACUTE CYSTITIS WITHOUT HEMATURIA: Primary | ICD-10-CM

## 2023-03-23 LAB
ANION GAP SERPL CALCULATED.3IONS-SCNC: 15 MMOL/L (ref 3–16)
BACTERIA URNS QL MICRO: ABNORMAL /HPF
BASOPHILS # BLD: 0.1 K/UL (ref 0–0.2)
BASOPHILS NFR BLD: 0.7 %
BILIRUB UR QL STRIP.AUTO: NEGATIVE
BUN SERPL-MCNC: 8 MG/DL (ref 7–20)
CALCIUM SERPL-MCNC: 9.1 MG/DL (ref 8.3–10.6)
CHLORIDE SERPL-SCNC: 109 MMOL/L (ref 99–110)
CLARITY UR: CLEAR
CO2 SERPL-SCNC: 18 MMOL/L (ref 21–32)
COLOR UR: YELLOW
CREAT SERPL-MCNC: <0.5 MG/DL (ref 0.6–1.1)
DEPRECATED RDW RBC AUTO: 15.3 % (ref 12.4–15.4)
EKG ATRIAL RATE: 120 BPM
EKG ATRIAL RATE: 98 BPM
EKG DIAGNOSIS: NORMAL
EKG DIAGNOSIS: NORMAL
EKG P AXIS: 2 DEGREES
EKG P AXIS: 60 DEGREES
EKG P-R INTERVAL: 152 MS
EKG P-R INTERVAL: 184 MS
EKG Q-T INTERVAL: 390 MS
EKG Q-T INTERVAL: 394 MS
EKG QRS DURATION: 126 MS
EKG QRS DURATION: 136 MS
EKG QTC CALCULATION (BAZETT): 497 MS
EKG QTC CALCULATION (BAZETT): 556 MS
EKG R AXIS: 35 DEGREES
EKG R AXIS: 57 DEGREES
EKG T AXIS: -10 DEGREES
EKG T AXIS: -23 DEGREES
EKG VENTRICULAR RATE: 120 BPM
EKG VENTRICULAR RATE: 98 BPM
EOSINOPHIL # BLD: 0.1 K/UL (ref 0–0.6)
EOSINOPHIL NFR BLD: 1 %
EPI CELLS #/AREA URNS HPF: ABNORMAL /HPF (ref 0–5)
GFR SERPLBLD CREATININE-BSD FMLA CKD-EPI: >60 ML/MIN/{1.73_M2}
GLUCOSE SERPL-MCNC: 110 MG/DL (ref 70–99)
GLUCOSE UR STRIP.AUTO-MCNC: NEGATIVE MG/DL
HCT VFR BLD AUTO: 37.4 % (ref 36–48)
HGB BLD-MCNC: 12.3 G/DL (ref 12–16)
HGB UR QL STRIP.AUTO: NEGATIVE
KETONES UR STRIP.AUTO-MCNC: NEGATIVE MG/DL
LEUKOCYTE ESTERASE UR QL STRIP.AUTO: ABNORMAL
LYMPHOCYTES # BLD: 1.4 K/UL (ref 1–5.1)
LYMPHOCYTES NFR BLD: 20.8 %
MCH RBC QN AUTO: 31 PG (ref 26–34)
MCHC RBC AUTO-ENTMCNC: 33.1 G/DL (ref 31–36)
MCV RBC AUTO: 93.7 FL (ref 80–100)
MONOCYTES # BLD: 0.5 K/UL (ref 0–1.3)
MONOCYTES NFR BLD: 7.3 %
NEUTROPHILS # BLD: 4.8 K/UL (ref 1.7–7.7)
NEUTROPHILS NFR BLD: 70.2 %
NITRITE UR QL STRIP.AUTO: POSITIVE
PH UR STRIP.AUTO: 7 [PH] (ref 5–8)
PLATELET # BLD AUTO: 213 K/UL (ref 135–450)
PMV BLD AUTO: 8.4 FL (ref 5–10.5)
POTASSIUM SERPL-SCNC: 3.9 MMOL/L (ref 3.5–5.1)
PROT UR STRIP.AUTO-MCNC: NEGATIVE MG/DL
RBC # BLD AUTO: 3.99 M/UL (ref 4–5.2)
RBC #/AREA URNS HPF: ABNORMAL /HPF (ref 0–4)
SODIUM SERPL-SCNC: 142 MMOL/L (ref 136–145)
SP GR UR STRIP.AUTO: 1.01 (ref 1–1.03)
TROPONIN T SERPL-MCNC: <0.01 NG/ML
UA COMPLETE W REFLEX CULTURE PNL UR: ABNORMAL
UA DIPSTICK W REFLEX MICRO PNL UR: YES
URN SPEC COLLECT METH UR: ABNORMAL
UROBILINOGEN UR STRIP-ACNC: 4 E.U./DL
WBC # BLD AUTO: 6.8 K/UL (ref 4–11)
WBC #/AREA URNS HPF: ABNORMAL /HPF (ref 0–5)

## 2023-03-23 PROCEDURE — 6370000000 HC RX 637 (ALT 250 FOR IP): Performed by: EMERGENCY MEDICINE

## 2023-03-23 PROCEDURE — 93005 ELECTROCARDIOGRAM TRACING: CPT | Performed by: EMERGENCY MEDICINE

## 2023-03-23 PROCEDURE — 2580000003 HC RX 258: Performed by: EMERGENCY MEDICINE

## 2023-03-23 PROCEDURE — 96365 THER/PROPH/DIAG IV INF INIT: CPT

## 2023-03-23 PROCEDURE — 80048 BASIC METABOLIC PNL TOTAL CA: CPT

## 2023-03-23 PROCEDURE — 85025 COMPLETE CBC W/AUTO DIFF WBC: CPT

## 2023-03-23 PROCEDURE — 6360000002 HC RX W HCPCS: Performed by: EMERGENCY MEDICINE

## 2023-03-23 PROCEDURE — 99284 EMERGENCY DEPT VISIT MOD MDM: CPT

## 2023-03-23 PROCEDURE — 81001 URINALYSIS AUTO W/SCOPE: CPT

## 2023-03-23 PROCEDURE — 84484 ASSAY OF TROPONIN QUANT: CPT

## 2023-03-23 RX ORDER — CEPHALEXIN 250 MG/1
250 CAPSULE ORAL 4 TIMES DAILY
Qty: 16 CAPSULE | Refills: 0 | Status: SHIPPED | OUTPATIENT
Start: 2023-03-23 | End: 2023-03-27

## 2023-03-23 RX ORDER — ACETAMINOPHEN 325 MG/1
650 TABLET ORAL ONCE
Status: COMPLETED | OUTPATIENT
Start: 2023-03-23 | End: 2023-03-23

## 2023-03-23 RX ORDER — OXYCODONE HYDROCHLORIDE 5 MG/1
5 TABLET ORAL ONCE
Status: COMPLETED | OUTPATIENT
Start: 2023-03-23 | End: 2023-03-23

## 2023-03-23 RX ORDER — SODIUM CHLORIDE, SODIUM LACTATE, POTASSIUM CHLORIDE, AND CALCIUM CHLORIDE .6; .31; .03; .02 G/100ML; G/100ML; G/100ML; G/100ML
1000 INJECTION, SOLUTION INTRAVENOUS ONCE
Status: COMPLETED | OUTPATIENT
Start: 2023-03-23 | End: 2023-03-23

## 2023-03-23 RX ADMIN — OXYCODONE HYDROCHLORIDE 5 MG: 5 TABLET ORAL at 10:11

## 2023-03-23 RX ADMIN — ACETAMINOPHEN 650 MG: 325 TABLET ORAL at 10:11

## 2023-03-23 RX ADMIN — CEFTRIAXONE 1000 MG: 1 INJECTION, POWDER, FOR SOLUTION INTRAMUSCULAR; INTRAVENOUS at 12:23

## 2023-03-23 RX ADMIN — SODIUM CHLORIDE, POTASSIUM CHLORIDE, SODIUM LACTATE AND CALCIUM CHLORIDE 1000 ML: 600; 310; 30; 20 INJECTION, SOLUTION INTRAVENOUS at 10:00

## 2023-03-23 ASSESSMENT — PAIN SCALES - GENERAL
PAINLEVEL_OUTOF10: 7
PAINLEVEL_OUTOF10: 4
PAINLEVEL_OUTOF10: 6
PAINLEVEL_OUTOF10: 4

## 2023-03-23 ASSESSMENT — PAIN DESCRIPTION - LOCATION
LOCATION: GENERALIZED
LOCATION: HEAD;GENERALIZED
LOCATION: GENERALIZED

## 2023-03-23 ASSESSMENT — PAIN DESCRIPTION - DESCRIPTORS
DESCRIPTORS: DISCOMFORT;ACHING
DESCRIPTORS: DISCOMFORT

## 2023-03-23 ASSESSMENT — PAIN DESCRIPTION - FREQUENCY: FREQUENCY: CONTINUOUS

## 2023-03-23 ASSESSMENT — PAIN - FUNCTIONAL ASSESSMENT
PAIN_FUNCTIONAL_ASSESSMENT: 0-10
PAIN_FUNCTIONAL_ASSESSMENT: 0-10

## 2023-03-23 ASSESSMENT — PAIN DESCRIPTION - PAIN TYPE: TYPE: ACUTE PAIN

## 2023-03-23 NOTE — DISCHARGE INSTRUCTIONS
You have a UTI. You have a prescription for Keflex. Take one tablet by mouth four times a day for the next four days. Your sodium was normal. The rest of your labs were also normal.    Follow up with your doctor. Come back to the ED if you have additional concerns.

## 2023-03-23 NOTE — CARE COORDINATION
A case management consult is noted to see Ms. Jammie Sosa in the emergency department. The chart was reviewed. The patient is well known to  from a previous admission. She is from [facility] level of care. She was discharged back to the facility yesterday but returned for complaints of fatigue and concern her Na++ is low. Medical work-up is in progress. Once it is complete, will meet with Ms. Jammie Sosa at the bedside to address her DC planning questions. Updated her primary RN, Rocco Marie. Daisy Underwood RN  Case Management  [phone]    Addendum 1200  CM confirmed with [facility] the pt is able to return. Advised she is interested in a new nursing facility. The pt told ED staff another facility has been found for her to go. However, the name, location, and status cannot be confirmed. Dr. Sharee Carrington stated she is stable for discharge back to her long-term care facility today. Addendum [phone] [id number]  CM met with Ms. Jammie Sosa at the bedside to explain she is not admitted to the hospital and will need to return to her long term care facility today. She stated she is not going back there. She has been in contact with Bozena at [facility] and wants to go there. A call was placed to The [address] of [address] [place] [address]. Spoke with Santiago in admissions. She has never received a referral for Ms. Jammie Sosa. Information was provided and she would not be accepted given her behavioral issues. Notified Ms. Jammie Sosa. She continues to insist she has spoken to vaious people called Bharath Espinal and Bozena and other names. No individuals by those names are employed in admissions at [address] [place] [address]. Pt has a history of confabulation. Pt stated she would have her family pick her up. Explained that would be acceptable. She continues to be argumentative with impaired insight, impaired judgement, and paranoia. [facility] EMS will transport back to [facility] this evening @ 645p.  Explained the pt has a challenging personal

## 2023-03-23 NOTE — ED PROVIDER NOTES
[facility] Emergency Department  Status Note   Emergency Physicians          Assessment      Elaine Richter remains in the Emergency Department awaiting transportation to [facility]. The patient's condition has been evaluated and no emergency condition has been found. Labs are reassuring and social work has arranged transfer of the patient back to her nursing home.      Plan      -Transfer to nursing home           Manuela Medley MD  03/23/23 5527

## 2023-03-23 NOTE — ED PROVIDER NOTES
K/uL   BMP w/ Reflex to MG   Result Value Ref Range    Sodium 142 136 - 145 mmol/L    Potassium reflex Magnesium 3.9 3.5 - 5.1 mmol/L    Chloride 109 99 - 110 mmol/L    CO2 18 (L) 21 - 32 mmol/L    Anion Gap 15 3 - 16    Glucose 110 (H) 70 - 99 mg/dL    BUN 8 7 - 20 mg/dL    Creatinine <0.5 (L) 0.6 - 1.1 mg/dL    Est, Glom Filt Rate >60 >60    Calcium 9.1 8.3 - 10.6 mg/dL   Troponin   Result Value Ref Range    Troponin <0.01 <0.01 ng/mL   Urinalysis with Reflex to Culture    Specimen: Urine   Result Value Ref Range    Color, UA Yellow Straw/Yellow    Clarity, UA Clear Clear    Glucose, Ur Negative Negative mg/dL    Bilirubin Urine Negative Negative    Ketones, Urine Negative Negative mg/dL    Specific Gravity, UA 1.015 1.005 - 1.030    Blood, Urine Negative Negative    pH, UA 7.0 5.0 - 8.0    Protein, UA Negative Negative mg/dL    Urobilinogen, Urine 4.0 (A) <2.0 E.U./dL    Nitrite, Urine POSITIVE (A) Negative    Leukocyte Esterase, Urine SMALL (A) Negative    Microscopic Examination YES     Urine Type NotGiven     Urine Reflex to Culture Not Indicated    Microscopic Urinalysis   Result Value Ref Range    WBC, UA 6-9 (A) 0 - 5 /HPF    RBC, UA 3-4 0 - 4 /HPF    Epithelial Cells, UA 2-5 0 - 5 /HPF    Bacteria, UA 4+ (A) None Seen /HPF   EKG 12 Lead   Result Value Ref Range    Ventricular Rate 120 BPM    Atrial Rate 120 BPM    P-R Interval 152 ms    QRS Duration 136 ms    Q-T Interval 394 ms    QTc Calculation (Bazett) 556 ms    P Axis 2 degrees    R Axis 57 degrees    T Axis -23 degrees    Diagnosis       EKG performed in ER and to be interpreted by ER physician. Confirmed by MD, ER ([id number]),  [address], [address] [place] [address] ([id number]) on 3/23/2023 12:44:48 PM     EKG   Sinus rhythm at 98 bpm with normal axis, mildly prolonged QTc at 497 ms. T wave inversions in V1 through V3 as well as in lead III. No ST segment changes. No signs of acute ischemia or strain. No changes since previous EKG from March 16, 2023.     ED BEDSIDE ULTRASOUND:  No results found. MOST RECENT VITALS:  BP: (!) 149/84,Temp: 97.7 °F (36.5 °C), Heart Rate: 98, Resp: 18, SpO2: 97 %     Procedures     None    ED Course     Nursing Notes, Past Medical Hx, Past Surgical Hx, Social Hx,Allergies, and Family Hx were reviewed. The patient was given the following medications:  Orders Placed This Encounter   Medications    lactated ringers bolus    oxyCODONE (ROXICODONE) immediate release tablet 5 mg    acetaminophen (TYLENOL) tablet 650 mg    cefTRIAXone (ROCEPHIN) 1,000 mg in sodium chloride 0.9 % 50 mL IVPB (mini-bag)     Order Specific Question:   Antimicrobial Indications     Answer:   Urinary Tract Infection    cephALEXin (KEFLEX) 250 MG capsule     Sig: Take 1 capsule by mouth 4 times daily for 4 days     Dispense:  16 capsule     Refill:  0       CONSULTS:  IP CONSULT TO CASE MANAGEMENT    Review of Systems     Review of Systems    Pertinent positive and negative findings as documented in the HPI, otherwise other systems were reviewed and were negative. Past Medical, Surgical, Family, and Social History     She has a past medical history of Falls frequently, History of opioid abuse ([address] Hypomagnesemia, Hyponatremia, Hypopituitarism ([address] Hypothyroidism, Insomnia, Lack of coordination, Legal blindness, Morbid obesity ([address] Pain syndrome, chronic, Sleep apnea, Venous insufficiency, and Weakness. She has no past surgical history on file. Her family history is not on file. She reports that she has never smoked. She has never been exposed to tobacco smoke. She has never used smokeless tobacco. She reports that she does not currently use alcohol. She reports that she does not use drugs. Medications     Previous Medications    ACETAMINOPHEN (TYLENOL) 325 MG TABLET    Take 650 mg by mouth every 6 hours as needed for Pain    ALPRAZOLAM (XANAX) 1 MG TABLET    Take 2 tablets by mouth 3 times daily as needed for Sleep for up to 3 days.  Max Daily Amount: 6

## 2023-03-23 NOTE — ED TRIAGE NOTES
Patient brought to the ER via EMS from [facility] with complaint of generalized weakness, dizziness, body aches and fatigue. Patient was just discharged back to the nursing home yesterday around 1-2 pm. Decreased appetite.  States that her sodium level is low again

## 2023-03-23 NOTE — ED TRIAGE NOTES
Patient brought to the ER via EMS from [facility] with complaint of generalized weakness, dizziness, body aches and fatigue. Patient was just discharged back to the nursing home yesterday around 1-2 pm. Decreased appetite.  States that her sodium level is low again

## 2023-04-23 ENCOUNTER — APPOINTMENT (OUTPATIENT)
Dept: GENERAL RADIOLOGY | Age: 58
End: 2023-04-23
Payer: MEDICARE

## 2023-04-23 ENCOUNTER — HOSPITAL ENCOUNTER (EMERGENCY)
Age: 58
Discharge: NURSING, REHAB OR LONG-TERM CARE | End: 2023-04-23
Attending: EMERGENCY MEDICINE
Payer: MEDICARE

## 2023-04-23 VITALS
OXYGEN SATURATION: 98 % | SYSTOLIC BLOOD PRESSURE: 158 MMHG | TEMPERATURE: 98 F | WEIGHT: 293 LBS | RESPIRATION RATE: 16 BRPM | HEART RATE: 100 BPM | BODY MASS INDEX: 51.91 KG/M2 | HEIGHT: 63 IN | DIASTOLIC BLOOD PRESSURE: 87 MMHG

## 2023-04-23 DIAGNOSIS — R07.9 CHEST PAIN, UNSPECIFIED TYPE: Primary | ICD-10-CM

## 2023-04-23 LAB
ANION GAP SERPL CALCULATED.3IONS-SCNC: 8 MMOL/L (ref 3–16)
BASOPHILS # BLD: 0 K/UL (ref 0–0.2)
BASOPHILS NFR BLD: 0.7 %
BUN SERPL-MCNC: 8 MG/DL (ref 7–20)
CALCIUM SERPL-MCNC: 8.8 MG/DL (ref 8.3–10.6)
CHLORIDE SERPL-SCNC: 103 MMOL/L (ref 99–110)
CO2 SERPL-SCNC: 27 MMOL/L (ref 21–32)
CREAT SERPL-MCNC: 0.6 MG/DL (ref 0.6–1.1)
DEPRECATED RDW RBC AUTO: 15.4 % (ref 12.4–15.4)
EOSINOPHIL # BLD: 0.1 K/UL (ref 0–0.6)
EOSINOPHIL NFR BLD: 1.7 %
GFR SERPLBLD CREATININE-BSD FMLA CKD-EPI: >60 ML/MIN/{1.73_M2}
GLUCOSE SERPL-MCNC: 100 MG/DL (ref 70–99)
HCT VFR BLD AUTO: 34.9 % (ref 36–48)
HGB BLD-MCNC: 11.5 G/DL (ref 12–16)
LYMPHOCYTES # BLD: 1.1 K/UL (ref 1–5.1)
LYMPHOCYTES NFR BLD: 27.2 %
MCH RBC QN AUTO: 30.5 PG (ref 26–34)
MCHC RBC AUTO-ENTMCNC: 33 G/DL (ref 31–36)
MCV RBC AUTO: 92.6 FL (ref 80–100)
MONOCYTES # BLD: 0.4 K/UL (ref 0–1.3)
MONOCYTES NFR BLD: 10.7 %
NEUTROPHILS # BLD: 2.4 K/UL (ref 1.7–7.7)
NEUTROPHILS NFR BLD: 59.7 %
NT-PROBNP SERPL-MCNC: 97 PG/ML (ref 0–124)
PLATELET # BLD AUTO: 179 K/UL (ref 135–450)
PMV BLD AUTO: 8.7 FL (ref 5–10.5)
POTASSIUM SERPL-SCNC: 4 MMOL/L (ref 3.5–5.1)
RBC # BLD AUTO: 3.77 M/UL (ref 4–5.2)
SODIUM SERPL-SCNC: 138 MMOL/L (ref 136–145)
TROPONIN, HIGH SENSITIVITY: 12 NG/L (ref 0–14)
TROPONIN, HIGH SENSITIVITY: 13 NG/L (ref 0–14)
WBC # BLD AUTO: 4 K/UL (ref 4–11)

## 2023-04-23 PROCEDURE — 71045 X-RAY EXAM CHEST 1 VIEW: CPT

## 2023-04-23 PROCEDURE — 85025 COMPLETE CBC W/AUTO DIFF WBC: CPT

## 2023-04-23 PROCEDURE — 2580000003 HC RX 258: Performed by: STUDENT IN AN ORGANIZED HEALTH CARE EDUCATION/TRAINING PROGRAM

## 2023-04-23 PROCEDURE — 93005 ELECTROCARDIOGRAM TRACING: CPT | Performed by: STUDENT IN AN ORGANIZED HEALTH CARE EDUCATION/TRAINING PROGRAM

## 2023-04-23 PROCEDURE — 83880 ASSAY OF NATRIURETIC PEPTIDE: CPT

## 2023-04-23 PROCEDURE — 99285 EMERGENCY DEPT VISIT HI MDM: CPT

## 2023-04-23 PROCEDURE — 80048 BASIC METABOLIC PNL TOTAL CA: CPT

## 2023-04-23 PROCEDURE — 93005 ELECTROCARDIOGRAM TRACING: CPT | Performed by: EMERGENCY MEDICINE

## 2023-04-23 PROCEDURE — 84484 ASSAY OF TROPONIN QUANT: CPT

## 2023-04-23 RX ORDER — SODIUM CHLORIDE, SODIUM LACTATE, POTASSIUM CHLORIDE, AND CALCIUM CHLORIDE .6; .31; .03; .02 G/100ML; G/100ML; G/100ML; G/100ML
1000 INJECTION, SOLUTION INTRAVENOUS ONCE
Status: COMPLETED | OUTPATIENT
Start: 2023-04-23 | End: 2023-04-23

## 2023-04-23 RX ADMIN — SODIUM CHLORIDE, POTASSIUM CHLORIDE, SODIUM LACTATE AND CALCIUM CHLORIDE 1000 ML: 600; 310; 30; 20 INJECTION, SOLUTION INTRAVENOUS at 16:29

## 2023-04-23 ASSESSMENT — ENCOUNTER SYMPTOMS
GASTROINTESTINAL NEGATIVE: 1
RESPIRATORY NEGATIVE: 1
EYES NEGATIVE: 1
ALLERGIC/IMMUNOLOGIC NEGATIVE: 1

## 2023-04-23 ASSESSMENT — PAIN DESCRIPTION - LOCATION: LOCATION: GROIN

## 2023-04-23 ASSESSMENT — LIFESTYLE VARIABLES
HOW MANY STANDARD DRINKS CONTAINING ALCOHOL DO YOU HAVE ON A TYPICAL DAY: PATIENT DOES NOT DRINK
HOW OFTEN DO YOU HAVE A DRINK CONTAINING ALCOHOL: NEVER

## 2023-04-23 ASSESSMENT — PAIN DESCRIPTION - FREQUENCY: FREQUENCY: INTERMITTENT

## 2023-04-23 ASSESSMENT — PAIN DESCRIPTION - PAIN TYPE: TYPE: ACUTE PAIN

## 2023-04-23 ASSESSMENT — PAIN - FUNCTIONAL ASSESSMENT
PAIN_FUNCTIONAL_ASSESSMENT: 0-10
PAIN_FUNCTIONAL_ASSESSMENT: ACTIVITIES ARE NOT PREVENTED

## 2023-04-23 ASSESSMENT — PAIN DESCRIPTION - ONSET: ONSET: ON-GOING

## 2023-04-23 ASSESSMENT — PAIN SCALES - GENERAL: PAINLEVEL_OUTOF10: 9

## 2023-04-24 LAB
EKG ATRIAL RATE: 101 BPM
EKG ATRIAL RATE: 102 BPM
EKG ATRIAL RATE: 113 BPM
EKG DIAGNOSIS: NORMAL
EKG P AXIS: 44 DEGREES
EKG P AXIS: 51 DEGREES
EKG P AXIS: 60 DEGREES
EKG P-R INTERVAL: 162 MS
EKG P-R INTERVAL: 176 MS
EKG P-R INTERVAL: 178 MS
EKG Q-T INTERVAL: 338 MS
EKG Q-T INTERVAL: 376 MS
EKG Q-T INTERVAL: 394 MS
EKG QRS DURATION: 112 MS
EKG QRS DURATION: 118 MS
EKG QRS DURATION: 140 MS
EKG QTC CALCULATION (BAZETT): 463 MS
EKG QTC CALCULATION (BAZETT): 490 MS
EKG QTC CALCULATION (BAZETT): 510 MS
EKG R AXIS: 33 DEGREES
EKG R AXIS: 33 DEGREES
EKG R AXIS: 37 DEGREES
EKG T AXIS: -22 DEGREES
EKG T AXIS: -43 DEGREES
EKG T AXIS: 5 DEGREES
EKG VENTRICULAR RATE: 101 BPM
EKG VENTRICULAR RATE: 102 BPM
EKG VENTRICULAR RATE: 113 BPM

## 2023-04-29 ENCOUNTER — HOSPITAL ENCOUNTER (EMERGENCY)
Age: 58
Discharge: HOME | End: 2023-04-29
Attending: EMERGENCY MEDICINE
Payer: MEDICARE

## 2023-04-29 ENCOUNTER — APPOINTMENT (OUTPATIENT)
Dept: GENERAL RADIOLOGY | Age: 58
End: 2023-04-29
Payer: MEDICARE

## 2023-04-29 VITALS
TEMPERATURE: 98.3 F | SYSTOLIC BLOOD PRESSURE: 111 MMHG | HEART RATE: 99 BPM | RESPIRATION RATE: 16 BRPM | BODY MASS INDEX: 51.91 KG/M2 | HEIGHT: 63 IN | DIASTOLIC BLOOD PRESSURE: 57 MMHG | WEIGHT: 293 LBS | OXYGEN SATURATION: 92 %

## 2023-04-29 DIAGNOSIS — Z13.9 ENCOUNTER FOR MEDICAL SCREENING EXAMINATION: Primary | ICD-10-CM

## 2023-04-29 LAB
ALBUMIN SERPL-MCNC: 2.6 G/DL (ref 3.4–5)
ALBUMIN/GLOB SERPL: 0.7 {RATIO} (ref 1.1–2.2)
ALP SERPL-CCNC: 130 U/L (ref 40–129)
ALT SERPL-CCNC: 32 U/L (ref 10–40)
ANION GAP SERPL CALCULATED.3IONS-SCNC: 11 MMOL/L (ref 3–16)
AST SERPL-CCNC: 96 U/L (ref 15–37)
BASE EXCESS BLDV CALC-SCNC: 1.5 MMOL/L (ref -2–3)
BASOPHILS # BLD: 0 K/UL (ref 0–0.2)
BASOPHILS NFR BLD: 0.7 %
BILIRUB SERPL-MCNC: 2.4 MG/DL (ref 0–1)
BILIRUB UR QL STRIP.AUTO: ABNORMAL
BUN SERPL-MCNC: 10 MG/DL (ref 7–20)
CALCIUM SERPL-MCNC: 8.7 MG/DL (ref 8.3–10.6)
CHLORIDE SERPL-SCNC: 104 MMOL/L (ref 99–110)
CLARITY UR: CLEAR
CO2 BLDV-SCNC: 29 MMOL/L
CO2 SERPL-SCNC: 23 MMOL/L (ref 21–32)
COHGB MFR BLDV: 0.1 % (ref 0–1.5)
COLOR UR: YELLOW
CREAT SERPL-MCNC: 0.6 MG/DL (ref 0.6–1.1)
DEPRECATED RDW RBC AUTO: 15.9 % (ref 12.4–15.4)
EKG ATRIAL RATE: 100 BPM
EKG DIAGNOSIS: NORMAL
EKG P AXIS: 66 DEGREES
EKG P-R INTERVAL: 160 MS
EKG Q-T INTERVAL: 396 MS
EKG QRS DURATION: 122 MS
EKG QTC CALCULATION (BAZETT): 510 MS
EKG R AXIS: 51 DEGREES
EKG T AXIS: -31 DEGREES
EKG VENTRICULAR RATE: 100 BPM
EOSINOPHIL # BLD: 0.1 K/UL (ref 0–0.6)
EOSINOPHIL NFR BLD: 2.7 %
GFR SERPLBLD CREATININE-BSD FMLA CKD-EPI: >60 ML/MIN/{1.73_M2}
GLUCOSE SERPL-MCNC: 95 MG/DL (ref 70–99)
GLUCOSE UR STRIP.AUTO-MCNC: NEGATIVE MG/DL
HCO3 BLDV-SCNC: 27.1 MMOL/L (ref 24–28)
HCT VFR BLD AUTO: 31 % (ref 36–48)
HGB BLD-MCNC: 10.2 G/DL (ref 12–16)
HGB UR QL STRIP.AUTO: NEGATIVE
INR PPP: 1.45 (ref 0.84–1.16)
KETONES UR STRIP.AUTO-MCNC: ABNORMAL MG/DL
LACTATE BLDV-SCNC: 1.2 MMOL/L (ref 0.4–2)
LEUKOCYTE ESTERASE UR QL STRIP.AUTO: NEGATIVE
LIPASE SERPL-CCNC: 26 U/L (ref 13–60)
LYMPHOCYTES # BLD: 1.2 K/UL (ref 1–5.1)
LYMPHOCYTES NFR BLD: 31.7 %
MCH RBC QN AUTO: 30.9 PG (ref 26–34)
MCHC RBC AUTO-ENTMCNC: 32.9 G/DL (ref 31–36)
MCV RBC AUTO: 93.8 FL (ref 80–100)
METHGB MFR BLDV: 0.6 % (ref 0–1.5)
MONOCYTES # BLD: 0.5 K/UL (ref 0–1.3)
MONOCYTES NFR BLD: 13 %
NEUTROPHILS # BLD: 2 K/UL (ref 1.7–7.7)
NEUTROPHILS NFR BLD: 51.9 %
NITRITE UR QL STRIP.AUTO: NEGATIVE
NT-PROBNP SERPL-MCNC: 230 PG/ML (ref 0–124)
PCO2 BLDV: 46.1 MMHG (ref 41–51)
PH BLDV: 7.38 [PH] (ref 7.35–7.45)
PH UR STRIP.AUTO: 6 [PH] (ref 5–8)
PLATELET # BLD AUTO: 168 K/UL (ref 135–450)
PMV BLD AUTO: 8.8 FL (ref 5–10.5)
PO2 BLDV: 139 MMHG (ref 25–40)
POTASSIUM SERPL-SCNC: 4.6 MMOL/L (ref 3.5–5.1)
PROT SERPL-MCNC: 6.2 G/DL (ref 6.4–8.2)
PROT UR STRIP.AUTO-MCNC: NEGATIVE MG/DL
PROTHROMBIN TIME: 17.6 SEC (ref 11.5–14.8)
RBC # BLD AUTO: 3.3 M/UL (ref 4–5.2)
SAO2 % BLDV: 98 %
SODIUM SERPL-SCNC: 138 MMOL/L (ref 136–145)
SP GR UR STRIP.AUTO: 1.02 (ref 1–1.03)
TROPONIN, HIGH SENSITIVITY: 11 NG/L (ref 0–14)
TROPONIN, HIGH SENSITIVITY: 12 NG/L (ref 0–14)
UA COMPLETE W REFLEX CULTURE PNL UR: ABNORMAL
UA DIPSTICK W REFLEX MICRO PNL UR: ABNORMAL
URN SPEC COLLECT METH UR: ABNORMAL
UROBILINOGEN UR STRIP-ACNC: >=8 E.U./DL
WBC # BLD AUTO: 3.9 K/UL (ref 4–11)

## 2023-04-29 PROCEDURE — 85610 PROTHROMBIN TIME: CPT

## 2023-04-29 PROCEDURE — 82803 BLOOD GASES ANY COMBINATION: CPT

## 2023-04-29 PROCEDURE — 93005 ELECTROCARDIOGRAM TRACING: CPT | Performed by: EMERGENCY MEDICINE

## 2023-04-29 PROCEDURE — 2580000003 HC RX 258: Performed by: EMERGENCY MEDICINE

## 2023-04-29 PROCEDURE — 84439 ASSAY OF FREE THYROXINE: CPT

## 2023-04-29 PROCEDURE — 84443 ASSAY THYROID STIM HORMONE: CPT

## 2023-04-29 PROCEDURE — 83880 ASSAY OF NATRIURETIC PEPTIDE: CPT

## 2023-04-29 PROCEDURE — 99285 EMERGENCY DEPT VISIT HI MDM: CPT

## 2023-04-29 PROCEDURE — 83605 ASSAY OF LACTIC ACID: CPT

## 2023-04-29 PROCEDURE — 85025 COMPLETE CBC W/AUTO DIFF WBC: CPT

## 2023-04-29 PROCEDURE — 83690 ASSAY OF LIPASE: CPT

## 2023-04-29 PROCEDURE — 36415 COLL VENOUS BLD VENIPUNCTURE: CPT

## 2023-04-29 PROCEDURE — 80053 COMPREHEN METABOLIC PANEL: CPT

## 2023-04-29 PROCEDURE — 81003 URINALYSIS AUTO W/O SCOPE: CPT

## 2023-04-29 PROCEDURE — 84484 ASSAY OF TROPONIN QUANT: CPT

## 2023-04-29 PROCEDURE — 71045 X-RAY EXAM CHEST 1 VIEW: CPT

## 2023-04-29 RX ORDER — 0.9 % SODIUM CHLORIDE 0.9 %
1000 INTRAVENOUS SOLUTION INTRAVENOUS ONCE
Status: COMPLETED | OUTPATIENT
Start: 2023-04-29 | End: 2023-04-29

## 2023-04-29 RX ADMIN — SODIUM CHLORIDE 1000 ML: 9 INJECTION, SOLUTION INTRAVENOUS at 02:37

## 2023-04-29 NOTE — ED NOTES
Report called to [employer].  Denies any follow up questions      Gary Paiz RN  04/29/23 7456

## 2023-04-30 LAB
T4 FREE SERPL-MCNC: 2.4 NG/DL (ref 0.9–1.8)
TSH SERPL DL<=0.005 MIU/L-ACNC: 0.03 UIU/ML (ref 0.27–4.2)

## 2023-06-04 ENCOUNTER — HOSPITAL ENCOUNTER (EMERGENCY)
Age: 58
Discharge: HOME | End: 2023-06-04
Attending: EMERGENCY MEDICINE
Payer: COMMERCIAL

## 2023-06-04 VITALS
RESPIRATION RATE: 17 BRPM | WEIGHT: 293 LBS | HEART RATE: 82 BPM | TEMPERATURE: 98.2 F | BODY MASS INDEX: 60.51 KG/M2 | SYSTOLIC BLOOD PRESSURE: 127 MMHG | OXYGEN SATURATION: 97 % | DIASTOLIC BLOOD PRESSURE: 76 MMHG

## 2023-06-04 DIAGNOSIS — Z13.9 ENCOUNTER FOR MEDICAL SCREENING EXAMINATION: Primary | ICD-10-CM

## 2023-06-04 LAB
ANION GAP SERPL CALCULATED.3IONS-SCNC: 11 MMOL/L (ref 3–16)
BUN SERPL-MCNC: 9 MG/DL (ref 7–20)
CALCIUM SERPL-MCNC: 9 MG/DL (ref 8.3–10.6)
CHLORIDE SERPL-SCNC: 108 MMOL/L (ref 99–110)
CO2 SERPL-SCNC: 20 MMOL/L (ref 21–32)
CREAT SERPL-MCNC: <0.5 MG/DL (ref 0.6–1.1)
GFR SERPLBLD CREATININE-BSD FMLA CKD-EPI: >60 ML/MIN/{1.73_M2}
GLUCOSE SERPL-MCNC: 103 MG/DL (ref 70–99)
POTASSIUM SERPL-SCNC: 4.1 MMOL/L (ref 3.5–5.1)
SODIUM SERPL-SCNC: 139 MMOL/L (ref 136–145)

## 2023-06-04 PROCEDURE — 80048 BASIC METABOLIC PNL TOTAL CA: CPT

## 2023-06-04 PROCEDURE — 99283 EMERGENCY DEPT VISIT LOW MDM: CPT

## 2023-06-04 PROCEDURE — 36415 COLL VENOUS BLD VENIPUNCTURE: CPT

## 2023-06-04 RX ORDER — DESMOPRESSIN ACETATE 0.1 MG/ML
10 SOLUTION NASAL NIGHTLY
Status: DISCONTINUED | OUTPATIENT
Start: 2023-06-04 | End: 2023-06-05 | Stop reason: HOSPADM

## 2023-06-04 RX ORDER — LEVOTHYROXINE SODIUM 0.12 MG/1
125 TABLET ORAL DAILY
Status: DISCONTINUED | OUTPATIENT
Start: 2023-06-04 | End: 2023-06-05 | Stop reason: HOSPADM

## 2023-06-04 RX ORDER — ACETAMINOPHEN 325 MG/1
650 TABLET ORAL EVERY 6 HOURS PRN
Status: DISCONTINUED | OUTPATIENT
Start: 2023-06-04 | End: 2023-06-05 | Stop reason: HOSPADM

## 2023-06-04 RX ORDER — MECOBALAMIN 5000 MCG
10 TABLET,DISINTEGRATING ORAL NIGHTLY
Status: DISCONTINUED | OUTPATIENT
Start: 2023-06-04 | End: 2023-06-05 | Stop reason: HOSPADM

## 2023-06-04 RX ORDER — ASPIRIN 81 MG/1
81 TABLET, CHEWABLE ORAL DAILY
Status: DISCONTINUED | OUTPATIENT
Start: 2023-06-04 | End: 2023-06-05 | Stop reason: HOSPADM

## 2023-06-04 RX ORDER — CITALOPRAM 20 MG/1
20 TABLET ORAL DAILY
Status: DISCONTINUED | OUTPATIENT
Start: 2023-06-04 | End: 2023-06-05 | Stop reason: HOSPADM

## 2023-06-04 RX ORDER — SENNA PLUS 8.6 MG/1
1 TABLET ORAL NIGHTLY
Status: DISCONTINUED | OUTPATIENT
Start: 2023-06-04 | End: 2023-06-05 | Stop reason: HOSPADM

## 2023-09-03 ENCOUNTER — APPOINTMENT (OUTPATIENT)
Dept: CT IMAGING | Age: 58
End: 2023-09-03
Payer: MEDICARE

## 2023-09-03 ENCOUNTER — HOSPITAL ENCOUNTER (INPATIENT)
Age: 58
LOS: 4 days | Discharge: NURSING, REHAB OR LONG-TERM CARE | End: 2023-09-07
Attending: EMERGENCY MEDICINE | Admitting: ANESTHESIOLOGY
Payer: MEDICARE

## 2023-09-03 ENCOUNTER — APPOINTMENT (OUTPATIENT)
Dept: GENERAL RADIOLOGY | Age: 58
End: 2023-09-03
Payer: MEDICARE

## 2023-09-03 DIAGNOSIS — K76.82 HEPATIC ENCEPHALOPATHY (HCC): Primary | ICD-10-CM

## 2023-09-03 DIAGNOSIS — N39.0 URINARY TRACT INFECTION WITHOUT HEMATURIA, SITE UNSPECIFIED: ICD-10-CM

## 2023-09-03 PROBLEM — R41.82 ALTERED MENTAL STATUS: Status: ACTIVE | Noted: 2023-09-03

## 2023-09-03 PROBLEM — R41.82 AMS (ALTERED MENTAL STATUS): Status: ACTIVE | Noted: 2023-09-03

## 2023-09-03 LAB
ALBUMIN SERPL-MCNC: 2.5 G/DL (ref 3.4–5)
ALBUMIN/GLOB SERPL: 0.6 {RATIO} (ref 1.1–2.2)
ALP SERPL-CCNC: 147 U/L (ref 40–129)
ALT SERPL-CCNC: 38 U/L (ref 10–40)
AMMONIA PLAS-SCNC: 86 UMOL/L (ref 11–51)
AMORPH SED URNS QL MICRO: ABNORMAL /HPF
AMPHETAMINES UR QL SCN>1000 NG/ML: ABNORMAL
ANION GAP SERPL CALCULATED.3IONS-SCNC: 8 MMOL/L (ref 3–16)
APAP SERPL-MCNC: <5 UG/ML (ref 10–30)
AST SERPL-CCNC: 65 U/L (ref 15–37)
BACTERIA URNS QL MICRO: ABNORMAL /HPF
BARBITURATES UR QL SCN>200 NG/ML: ABNORMAL
BASE EXCESS BLDV CALC-SCNC: 3.5 MMOL/L (ref -2–3)
BASOPHILS # BLD: 0 K/UL (ref 0–0.2)
BASOPHILS NFR BLD: 0.8 %
BENZODIAZ UR QL SCN>200 NG/ML: POSITIVE
BILIRUB DIRECT SERPL-MCNC: 1.3 MG/DL (ref 0–0.3)
BILIRUB INDIRECT SERPL-MCNC: 2.1 MG/DL (ref 0–1)
BILIRUB SERPL-MCNC: 3.4 MG/DL (ref 0–1)
BILIRUB SERPL-MCNC: 4.2 MG/DL (ref 0–1)
BILIRUB UR QL STRIP.AUTO: ABNORMAL
BUN SERPL-MCNC: 17 MG/DL (ref 7–20)
CALCIUM SERPL-MCNC: 8.8 MG/DL (ref 8.3–10.6)
CANNABINOIDS UR QL SCN>50 NG/ML: ABNORMAL
CHLORIDE SERPL-SCNC: 103 MMOL/L (ref 99–110)
CLARITY UR: CLEAR
CO2 BLDV-SCNC: 29 MMOL/L
CO2 SERPL-SCNC: 25 MMOL/L (ref 21–32)
COCAINE UR QL SCN: ABNORMAL
COHGB MFR BLDV: 1.5 % (ref 0–1.5)
COLOR UR: YELLOW
CREAT SERPL-MCNC: 0.7 MG/DL (ref 0.6–1.1)
DEPRECATED RDW RBC AUTO: 15.9 % (ref 12.4–15.4)
DRUG SCREEN COMMENT UR-IMP: ABNORMAL
EKG ATRIAL RATE: 93 BPM
EKG DIAGNOSIS: NORMAL
EKG P AXIS: 61 DEGREES
EKG P-R INTERVAL: 160 MS
EKG Q-T INTERVAL: 380 MS
EKG QRS DURATION: 114 MS
EKG QTC CALCULATION (BAZETT): 472 MS
EKG R AXIS: 42 DEGREES
EKG T AXIS: -40 DEGREES
EKG VENTRICULAR RATE: 93 BPM
EOSINOPHIL # BLD: 0.2 K/UL (ref 0–0.6)
EOSINOPHIL NFR BLD: 3.2 %
EPI CELLS #/AREA URNS HPF: ABNORMAL /HPF (ref 0–5)
ETHANOLAMINE SERPL-MCNC: NORMAL MG/DL (ref 0–0.08)
FENTANYL SCREEN, URINE: ABNORMAL
FERRITIN SERPL IA-MCNC: 249.2 NG/ML (ref 15–150)
GFR SERPLBLD CREATININE-BSD FMLA CKD-EPI: >60 ML/MIN/{1.73_M2}
GLUCOSE BLD-MCNC: 82 MG/DL (ref 70–99)
GLUCOSE SERPL-MCNC: 105 MG/DL (ref 70–99)
GLUCOSE UR STRIP.AUTO-MCNC: NEGATIVE MG/DL
HCO3 BLDV-SCNC: 27.4 MMOL/L (ref 24–28)
HCT VFR BLD AUTO: 33.2 % (ref 36–48)
HGB BLD-MCNC: 11.3 G/DL (ref 12–16)
HGB UR QL STRIP.AUTO: ABNORMAL
KETONES UR STRIP.AUTO-MCNC: ABNORMAL MG/DL
LACTATE BLDV-SCNC: 1.2 MMOL/L (ref 0.4–2)
LEUKOCYTE ESTERASE UR QL STRIP.AUTO: ABNORMAL
LYMPHOCYTES # BLD: 1.4 K/UL (ref 1–5.1)
LYMPHOCYTES NFR BLD: 24.1 %
MCH RBC QN AUTO: 33.3 PG (ref 26–34)
MCHC RBC AUTO-ENTMCNC: 34 G/DL (ref 31–36)
MCV RBC AUTO: 98 FL (ref 80–100)
METHADONE UR QL SCN>300 NG/ML: ABNORMAL
METHGB MFR BLDV: 0.3 % (ref 0–1.5)
MONOCYTES # BLD: 0.8 K/UL (ref 0–1.3)
MONOCYTES NFR BLD: 14.1 %
NEUTROPHILS # BLD: 3.4 K/UL (ref 1.7–7.7)
NEUTROPHILS NFR BLD: 57.8 %
NITRITE UR QL STRIP.AUTO: POSITIVE
OPIATES UR QL SCN>300 NG/ML: ABNORMAL
OXYCODONE UR QL SCN: POSITIVE
PCO2 BLDV: 38.1 MMHG (ref 41–51)
PCP UR QL SCN>25 NG/ML: ABNORMAL
PERFORMED ON: NORMAL
PH BLDV: 7.46 [PH] (ref 7.35–7.45)
PH UR STRIP.AUTO: 6.5 [PH] (ref 5–8)
PH UR STRIP: 5.5 [PH]
PLATELET # BLD AUTO: 154 K/UL (ref 135–450)
PLATELET BLD QL SMEAR: ADEQUATE
PMV BLD AUTO: 8.1 FL (ref 5–10.5)
PO2 BLDV: 136 MMHG (ref 25–40)
POTASSIUM SERPL-SCNC: 4.1 MMOL/L (ref 3.5–5.1)
PROT SERPL-MCNC: 6.4 G/DL (ref 6.4–8.2)
PROT UR STRIP.AUTO-MCNC: NEGATIVE MG/DL
RBC # BLD AUTO: 3.38 M/UL (ref 4–5.2)
RBC #/AREA URNS HPF: ABNORMAL /HPF (ref 0–4)
SALICYLATES SERPL-MCNC: <0.3 MG/DL (ref 15–30)
SAO2 % BLDV: 99 %
SARS-COV-2 RDRP RESP QL NAA+PROBE: NOT DETECTED
SLIDE REVIEW: ABNORMAL
SODIUM SERPL-SCNC: 136 MMOL/L (ref 136–145)
SP GR UR STRIP.AUTO: 1.02 (ref 1–1.03)
TROPONIN, HIGH SENSITIVITY: 25 NG/L (ref 0–14)
TROPONIN, HIGH SENSITIVITY: 30 NG/L (ref 0–14)
UA DIPSTICK W REFLEX MICRO PNL UR: YES
URN SPEC COLLECT METH UR: ABNORMAL
UROBILINOGEN UR STRIP-ACNC: >=8 E.U./DL
WBC # BLD AUTO: 5.9 K/UL (ref 4–11)
WBC #/AREA URNS HPF: ABNORMAL /HPF (ref 0–5)

## 2023-09-03 PROCEDURE — 96360 HYDRATION IV INFUSION INIT: CPT

## 2023-09-03 PROCEDURE — 80179 DRUG ASSAY SALICYLATE: CPT

## 2023-09-03 PROCEDURE — 82077 ASSAY SPEC XCP UR&BREATH IA: CPT

## 2023-09-03 PROCEDURE — 6370000000 HC RX 637 (ALT 250 FOR IP)

## 2023-09-03 PROCEDURE — 2580000003 HC RX 258

## 2023-09-03 PROCEDURE — 85025 COMPLETE CBC W/AUTO DIFF WBC: CPT

## 2023-09-03 PROCEDURE — 93005 ELECTROCARDIOGRAM TRACING: CPT | Performed by: EMERGENCY MEDICINE

## 2023-09-03 PROCEDURE — 82248 BILIRUBIN DIRECT: CPT

## 2023-09-03 PROCEDURE — 70450 CT HEAD/BRAIN W/O DYE: CPT

## 2023-09-03 PROCEDURE — 2580000003 HC RX 258: Performed by: EMERGENCY MEDICINE

## 2023-09-03 PROCEDURE — 81001 URINALYSIS AUTO W/SCOPE: CPT

## 2023-09-03 PROCEDURE — 36415 COLL VENOUS BLD VENIPUNCTURE: CPT

## 2023-09-03 PROCEDURE — 83550 IRON BINDING TEST: CPT

## 2023-09-03 PROCEDURE — 80143 DRUG ASSAY ACETAMINOPHEN: CPT

## 2023-09-03 PROCEDURE — 87635 SARS-COV-2 COVID-19 AMP PRB: CPT

## 2023-09-03 PROCEDURE — 80053 COMPREHEN METABOLIC PANEL: CPT

## 2023-09-03 PROCEDURE — 84484 ASSAY OF TROPONIN QUANT: CPT

## 2023-09-03 PROCEDURE — 82803 BLOOD GASES ANY COMBINATION: CPT

## 2023-09-03 PROCEDURE — 83540 ASSAY OF IRON: CPT

## 2023-09-03 PROCEDURE — 87086 URINE CULTURE/COLONY COUNT: CPT

## 2023-09-03 PROCEDURE — 82140 ASSAY OF AMMONIA: CPT

## 2023-09-03 PROCEDURE — 74176 CT ABD & PELVIS W/O CONTRAST: CPT

## 2023-09-03 PROCEDURE — 6360000002 HC RX W HCPCS: Performed by: EMERGENCY MEDICINE

## 2023-09-03 PROCEDURE — 80307 DRUG TEST PRSMV CHEM ANLYZR: CPT

## 2023-09-03 PROCEDURE — 82247 BILIRUBIN TOTAL: CPT

## 2023-09-03 PROCEDURE — 71045 X-RAY EXAM CHEST 1 VIEW: CPT

## 2023-09-03 PROCEDURE — 83605 ASSAY OF LACTIC ACID: CPT

## 2023-09-03 PROCEDURE — 99285 EMERGENCY DEPT VISIT HI MDM: CPT

## 2023-09-03 PROCEDURE — 82728 ASSAY OF FERRITIN: CPT

## 2023-09-03 PROCEDURE — 1200000000 HC SEMI PRIVATE

## 2023-09-03 RX ORDER — BUSPIRONE HYDROCHLORIDE 5 MG/1
5 TABLET ORAL 2 TIMES DAILY
COMMUNITY

## 2023-09-03 RX ORDER — SODIUM CHLORIDE, SODIUM LACTATE, POTASSIUM CHLORIDE, AND CALCIUM CHLORIDE .6; .31; .03; .02 G/100ML; G/100ML; G/100ML; G/100ML
1000 INJECTION, SOLUTION INTRAVENOUS ONCE
Status: COMPLETED | OUTPATIENT
Start: 2023-09-03 | End: 2023-09-03

## 2023-09-03 RX ORDER — LEVOTHYROXINE SODIUM 0.12 MG/1
125 TABLET ORAL DAILY
Status: DISCONTINUED | OUTPATIENT
Start: 2023-09-04 | End: 2023-09-04

## 2023-09-03 RX ORDER — DULOXETIN HYDROCHLORIDE 60 MG/1
60 CAPSULE, DELAYED RELEASE ORAL DAILY
COMMUNITY

## 2023-09-03 RX ORDER — OLANZAPINE 5 MG/1
5 TABLET ORAL 2 TIMES DAILY
COMMUNITY

## 2023-09-03 RX ORDER — ALPRAZOLAM 2 MG/1
2 TABLET ORAL 2 TIMES DAILY
COMMUNITY

## 2023-09-03 RX ORDER — GABAPENTIN 400 MG/1
400 CAPSULE ORAL 2 TIMES DAILY
COMMUNITY

## 2023-09-03 RX ORDER — SODIUM CHLORIDE, SODIUM LACTATE, POTASSIUM CHLORIDE, CALCIUM CHLORIDE 600; 310; 30; 20 MG/100ML; MG/100ML; MG/100ML; MG/100ML
INJECTION, SOLUTION INTRAVENOUS CONTINUOUS
Status: DISCONTINUED | OUTPATIENT
Start: 2023-09-03 | End: 2023-09-05

## 2023-09-03 RX ORDER — GABAPENTIN 400 MG/1
400 CAPSULE ORAL 2 TIMES DAILY
Status: DISCONTINUED | OUTPATIENT
Start: 2023-09-03 | End: 2023-09-07 | Stop reason: HOSPADM

## 2023-09-03 RX ORDER — OLANZAPINE 5 MG/1
5 TABLET ORAL 2 TIMES DAILY
Status: ON HOLD | COMMUNITY
End: 2023-09-03

## 2023-09-03 RX ORDER — LEVOTHYROXINE SODIUM 0.07 MG/1
75 TABLET ORAL DAILY
COMMUNITY

## 2023-09-03 RX ORDER — DEXTROSE MONOHYDRATE 100 MG/ML
INJECTION, SOLUTION INTRAVENOUS CONTINUOUS PRN
Status: DISCONTINUED | OUTPATIENT
Start: 2023-09-03 | End: 2023-09-07 | Stop reason: HOSPADM

## 2023-09-03 RX ORDER — OXYCODONE AND ACETAMINOPHEN 10; 325 MG/1; MG/1
1 TABLET ORAL EVERY 6 HOURS PRN
COMMUNITY

## 2023-09-03 RX ORDER — LIDOCAINE 4 G/G
1 PATCH TOPICAL DAILY
COMMUNITY

## 2023-09-03 RX ORDER — LACTULOSE 10 G/15ML
20 SOLUTION ORAL 3 TIMES DAILY
Status: DISCONTINUED | OUTPATIENT
Start: 2023-09-03 | End: 2023-09-06

## 2023-09-03 RX ADMIN — GABAPENTIN 400 MG: 400 CAPSULE ORAL at 21:22

## 2023-09-03 RX ADMIN — SODIUM CHLORIDE, POTASSIUM CHLORIDE, SODIUM LACTATE AND CALCIUM CHLORIDE 1000 ML: 600; 310; 30; 20 INJECTION, SOLUTION INTRAVENOUS at 09:16

## 2023-09-03 RX ADMIN — LACTULOSE 20 G: 20 SOLUTION ORAL at 21:21

## 2023-09-03 RX ADMIN — CEFTRIAXONE 1000 MG: 1 INJECTION, POWDER, FOR SOLUTION INTRAMUSCULAR; INTRAVENOUS at 16:17

## 2023-09-03 RX ADMIN — RIFAXIMIN 400 MG: 200 TABLET ORAL at 21:22

## 2023-09-03 RX ADMIN — SODIUM CHLORIDE, POTASSIUM CHLORIDE, SODIUM LACTATE AND CALCIUM CHLORIDE: 600; 310; 30; 20 INJECTION, SOLUTION INTRAVENOUS at 18:33

## 2023-09-03 ASSESSMENT — PAIN - FUNCTIONAL ASSESSMENT: PAIN_FUNCTIONAL_ASSESSMENT: NONE - DENIES PAIN

## 2023-09-03 NOTE — H&P
Internal Medicine  PGY 1  History & Physical      CC: \"Not been herself\"    HISTORY OF PRESENT ILLNESS:  Alecia Posadas is a 65year old with pmhx of diabetes insipidus, panhypopitutiarism, recurrent episodes of hyponatremia, schizoaffective disorder, depression, generalized anxiety disorder, chronic back pain, peripheral neuropathy presents from [address] [place] [address] due to her just not being herself. Patient was a poor historian. Talked to the [facility] healthcare, patient was not coherent and looked yellow so they decided to bring her to the ED. The patient was mostly concerned about her nurse and her boyfriend was not giving us an important history regarding why she arrived to the hospital.     Of note, patient had multiple ED visits throughout this year for episodes of feeling like her sodium was low, chest pain, and altered mental status but no history of any liver condition. At the ED she said she was complaining of feeling very fatigued and generally unwell. She stated that she hit her head and described a left sided headache. Denied any URI, SOB, chest pain, nausea, vomiting, abdominal pain. Vitals were 101/54, temp 97.9, pulse 86, respirations 16, SpO2 96%. Troponin level mildly elevated but down trended. EKG with no ischemic findings. CXR showed no pulmonary edema. Head CT showed no acute abnormality. CMP showed elevated , bilirubin of 4.2. Lactic acid normal of 1.2. Ammonia of 86. A dose of lactulose was given. Urinalysis showed 4+ bacteria and 6-9 WBC, she was given a dose of rocephin in the ED.      Past Medical History:        Diagnosis Date    Falls frequently     History of opioid abuse ([facility])     Hypomagnesemia     Hyponatremia     Hypopituitarism (HCC)     Hypothyroidism     Insomnia     Lack of coordination     Legal blindness     Morbid obesity (HCC)     Pain syndrome, chronic     Sleep apnea     Venous insufficiency     Weakness        Past Surgical History:        Procedure Laterality Date    GASTRIC BYPASS SURGERY         Medications Priorto Admission:    Not in a hospital admission. Allergies:  Ketorolac, Sulfanilamide, and Tramadol    Social History:   TOBACCO:   reports that she has never smoked. She has never been exposed to tobacco smoke. She has never used smokeless tobacco.  ETOH:   reports that she does not currently use alcohol. DRUGS : Percocet, Xanax   Patient currently lives in an assisted living environment    Family History:   History reviewed. No pertinent family history. Review of Systems   Reason unable to perform ROS: Poor historian. ROS: A 10 point review of systems was conducted, significant findings as noted in HPI. Physical Exam  Constitutional:       Appearance: She is obese. She is toxic-appearing. Comments: Jaundice   HENT:      Head: Normocephalic and atraumatic. Nose: Nose normal.      Mouth/Throat:      Mouth: Mucous membranes are moist.   Eyes:      Comments: Bilateral dilated pupils    Cardiovascular:      Rate and Rhythm: Normal rate and regular rhythm. Pulses: Normal pulses. Heart sounds: Normal heart sounds. Pulmonary:      Effort: Pulmonary effort is normal.      Breath sounds: Normal breath sounds. Abdominal:      General: Abdomen is flat. Bowel sounds are normal. There is no distension. Palpations: Abdomen is soft. Tenderness: There is no abdominal tenderness. There is no guarding or rebound. Musculoskeletal:         General: Normal range of motion. Cervical back: Normal range of motion. Skin:     General: Skin is warm and dry.    Neurological:      Comments: Oriented to self   Psychiatric:      Comments: Disoriented      Physical exam:       Vitals:    09/03/23 1615   BP: 112/63   Pulse: 90   Resp: 11   Temp:    SpO2: 100%       DATA:    Labs:  CBC:   Recent Labs     09/03/23  0937   WBC 5.9   HGB 11.3*   HCT 33.2*          BMP:   Recent Labs     09/03/23  0937      K 4.1

## 2023-09-03 NOTE — ED PROVIDER NOTES
[facility]          ATTENDING PHYSICIAN NOTE       Date of evaluation: 9/3/2023    Chief Complaint     Altered Mental Status (Pt presents via EMS from [facility] for c/o \"not feeling well\" ECF reports pt has \"just not been herself\" Pt reports fatigue. )      History of Present Illness     Ismael Sosa is a 65 y.o. female with a complex past medical history that includes panhypopituitarism with central diabetes insipidus and recurrent episodes of hyponatremia, schizoaffective disorder, depression, generalized anxiety disorder, chronic back pain on chronic Percocet, peripheral neuropathy on gabapentin, and some other medical comorbidities. She has been residing at [facility] since March of this year. She presents to the emergency department by EMS for concerns of altered mental status. By report from the transport team, the nursing facility indicates the patient just does not seem to be acting like herself. She is sleepy, but arousable. She complains of feeling very fatigued and generally unwell. She states that she has been essentially sleeping for the last 3 days, with poor p.o. intake as a result of this. She states that she believes she hit her head, although states that she did not fall, and cannot describe how she may have hit her head. She describes a left-sided headache. She denies URI symptoms or cough, shortness of breath or chest pain. She denies nausea, vomiting, abdominal pain, changes in bowel habits. She denies urinary symptoms. She states that she does not feel like her sodium is low, because when her sodium gets low she feels different than this. Review of Systems     Review of Systems   All other systems reviewed and are negative.     Past Medical, Surgical, Family, and Social History     She has a past medical history of Falls frequently, History of opioid abuse ([address]), Hypomagnesemia, Hyponatremia, Hypopituitarism

## 2023-09-03 NOTE — ED TRIAGE NOTES
Pt presents via EMS from [facility] for c/o \"not feeling well\" ECF reports pt has \"just not been herself\" Pt reports fatigue.

## 2023-09-03 NOTE — PROGRESS NOTES
4 Eyes Skin Assessment     NAME:  Rachid Barber  YOB: 1965  MEDICAL RECORD NUMBER:  [id number]    The patient is being assessed for  Admission    I agree that at least one RN has performed a thorough Head to Toe Skin Assessment on the patient. ALL assessment sites listed below have been assessed. Areas assessed by both nurses:    Head, Face, Ears, Shoulders, Back, Chest, Arms, Elbows, Hands, Sacrum. Buttock, Coccyx, Ischium, Legs. Feet and Heels, and Under Medical Devices         Does the Patient have a Wound? Yes wound(s) were present on assessment.  LDA wound assessment was Initiated and completed by RN       Pato Prevention initiated by RN: Yes  Wound Care Orders initiated by RN: Yes    Pressure Injury (Stage 3,4, Unstageable, DTI, NWPT, and Complex wounds) if present, place Wound referral order by RN under : No    New Ostomies, if present place, Ostomy referral order under : No     Nurse 1 eSignature: Electronically signed by Alessia Jovel RN on 9/3/23 at 6:01 PM EDT    **SHARE this note so that the co-signing nurse can place an eSignature**    Nurse 2 eSignature: Electronically signed by Desier Cotton RN on 9/3/23 at 7:47 PM EDT

## 2023-09-03 NOTE — PROGRESS NOTES
Patient admitted  to room 5315 from ED. Patient is A&O x 2-3. VSS. Patient oriented to the room all safety measures in place. Patient given IS and SCDs at this time. Admission orders released and patient 4 eyes completed. Admission documentation completed. No other needs are noted at this time.     [x] Bed alarm on and cord plugged into wall  [x] Bed in lowest position  [x] Call light and bedside table within reach  [x] Patient educated on all safety measures  [x]Oxygen connected to wall (if applicable)     Nurse 1 Esignature: Electronically signed by Armand Aguilar RN on 9/3/23 at 6:02 PM EDT  Nurse 2 Esignature: Electronically signed by Milo Marti RN on 9/3/23 at 7:47 PM EDT

## 2023-09-03 NOTE — PROGRESS NOTES
Patient admitted to room #5315 from ED. Patient is from [facility]. Patient Alert to self, locations, confused about the year. Patient drowsy- awakes easily when spoken to. Denies any pain or nausea. Patient placed on telemetry as ordered. Lab called for lab orders. VS /63, HR 94 100% room air 97.6 temp. Patient obese, skin folds look Ok. Small Black area on 3rd toes left asnd right foot. Left buttocks with stage 1 area  Right posterior thigh region with stage 1 area. 4 eyes completed. Call light within reach, bed alarm on. Door open. Patient denies any needs or concerns.

## 2023-09-03 NOTE — ED NOTES
following components:    Bilirubin Urine SMALL (*)     Ketones, Urine TRACE (*)     Blood, Urine SMALL (*)     Urobilinogen, Urine >=8.0 (*)     Nitrite, Urine POSITIVE (*)     Leukocyte Esterase, Urine SMALL (*)     WBC, UA 6-9 (*)     RBC, UA 5-10 (*)     Bacteria, UA 4+ (*)     All other components within normal limits   TROPONIN - Abnormal; Notable for the following components:    Troponin, High Sensitivity 30 (*)     All other components within normal limits   TROPONIN - Abnormal; Notable for the following components:    Troponin, High Sensitivity 25 (*)     All other components within normal limits   AMMONIA - Abnormal; Notable for the following components:    Ammonia 86 (*)     All other components within normal limits     Critical values: no     Abnormal Assessment Findings: Ammonia 86    Background  History:   Past Medical History:   Diagnosis Date    Falls frequently     History of opioid abuse ([address])     Hypomagnesemia     Hyponatremia     Hypopituitarism (HCC)     Hypothyroidism     Insomnia     Lack of coordination     Legal blindness     Morbid obesity (HCC)     Pain syndrome, chronic     Sleep apnea     Venous insufficiency     Weakness        Assessment    Vitals/MEWS: MEWS Score: 2  Level of Consciousness: Responds to voice (1)   Vitals:    09/03/23 0945 09/03/23 1315 09/03/23 1415 09/03/23 1615   BP: 117/69 116/79 (!) 101/54 112/63   Pulse: 94 89 86 90   Resp: 18 18 16 11   Temp:       TempSrc:       SpO2: 97% 98% 96% 100%   Weight:       Height:         FiO2 (%):   O2 Flow Rate: O2 Device: None (Room air)    Cardiac Rhythm: Cardiac Rhythm: Sinus rhythm  Pain Assessment:  [x] Verbal [] Fayrene Oas Scale  Pain Scale: Pain Assessment  Pain Assessment: None - Denies Pain  Last documented pain score (0-10 scale)    Last documented pain medication administered: see mar  Mental Status: disoriented  Orientation Level:    NIH Score:    C-SSRS: Risk of Suicide: No Risk  Bedside swallow:    [place] Coma Scale (GCS): [place] Coma Scale  Eye Opening: Spontaneous  Best Verbal Response: Oriented  Best Motor Response: Obeys commands  [place] Coma Scale Score: 15  Active LDA's:   Peripheral IV 09/03/23 Right;Upper Arm (Active)   Site Assessment Clean, dry & intact 09/03/23 0915   Line Status Blood return noted;Capped 09/03/23 0915   Dressing Status New dressing applied 09/03/23 0915   Dressing Type Transparent 09/03/23 0915   Dressing Intervention New 09/03/23 0915     PO Status:   Pertinent or High Risk Medications/Drips: no   If Yes, please provide details:   Pending Blood Product Administration: no       You may also review the ED PT Care Timeline found under the Summary Nursing Index tab. Recommendation    Pending orders : see admit orders   Plan for Discharge (if known): Additional Comments: sending urine drug screen, patient confused and pupils dilated. Drowsy but responds to questions.  Patient can be agitated at times- cussing out staff  If any further questions, please call Sending RN at [phone]    Electronically signed by: Electronically signed by Holden Goss RN on 9/3/2023 at 4:56 PM      Holden Goss RN  09/03/23 [phone]

## 2023-09-04 LAB
ANION GAP SERPL CALCULATED.3IONS-SCNC: 10 MMOL/L (ref 3–16)
BACTERIA UR CULT: NORMAL
BASOPHILS # BLD: 0 K/UL (ref 0–0.2)
BASOPHILS NFR BLD: 0.5 %
BUN SERPL-MCNC: 18 MG/DL (ref 7–20)
CALCIUM SERPL-MCNC: 8.8 MG/DL (ref 8.3–10.6)
CHLORIDE SERPL-SCNC: 104 MMOL/L (ref 99–110)
CO2 SERPL-SCNC: 24 MMOL/L (ref 21–32)
CREAT SERPL-MCNC: 0.5 MG/DL (ref 0.6–1.1)
DEPRECATED RDW RBC AUTO: 15.8 % (ref 12.4–15.4)
EOSINOPHIL # BLD: 0.1 K/UL (ref 0–0.6)
EOSINOPHIL NFR BLD: 2.5 %
GFR SERPLBLD CREATININE-BSD FMLA CKD-EPI: >60 ML/MIN/{1.73_M2}
GLUCOSE BLD-MCNC: 119 MG/DL (ref 70–99)
GLUCOSE BLD-MCNC: 138 MG/DL (ref 70–99)
GLUCOSE BLD-MCNC: 80 MG/DL (ref 70–99)
GLUCOSE BLD-MCNC: 93 MG/DL (ref 70–99)
GLUCOSE SERPL-MCNC: 85 MG/DL (ref 70–99)
HAV IGM SERPL QL IA: NORMAL
HBV CORE IGM SERPL QL IA: NORMAL
HBV SURFACE AG SERPL QL IA: NORMAL
HCT VFR BLD AUTO: 32.3 % (ref 36–48)
HCV AB SERPL QL IA: NORMAL
HGB BLD-MCNC: 11.1 G/DL (ref 12–16)
INR PPP: 1.59 (ref 0.84–1.16)
IRON SATN MFR SERPL: 31 % (ref 15–50)
IRON SERPL-MCNC: 78 UG/DL (ref 37–145)
LYMPHOCYTES # BLD: 1.5 K/UL (ref 1–5.1)
LYMPHOCYTES NFR BLD: 26 %
MAGNESIUM SERPL-MCNC: 1.6 MG/DL (ref 1.8–2.4)
MCH RBC QN AUTO: 33.5 PG (ref 26–34)
MCHC RBC AUTO-ENTMCNC: 34.5 G/DL (ref 31–36)
MCV RBC AUTO: 97.1 FL (ref 80–100)
MONOCYTES # BLD: 0.8 K/UL (ref 0–1.3)
MONOCYTES NFR BLD: 14.1 %
NEUTROPHILS # BLD: 3.3 K/UL (ref 1.7–7.7)
NEUTROPHILS NFR BLD: 56.9 %
PERFORMED ON: ABNORMAL
PERFORMED ON: ABNORMAL
PERFORMED ON: NORMAL
PERFORMED ON: NORMAL
PLATELET # BLD AUTO: 148 K/UL (ref 135–450)
PLATELET BLD QL SMEAR: ADEQUATE
PMV BLD AUTO: 8.1 FL (ref 5–10.5)
POTASSIUM SERPL-SCNC: 4 MMOL/L (ref 3.5–5.1)
PROTHROMBIN TIME: 18.9 SEC (ref 11.5–14.8)
RBC # BLD AUTO: 3.32 M/UL (ref 4–5.2)
SLIDE REVIEW: ABNORMAL
SODIUM SERPL-SCNC: 138 MMOL/L (ref 136–145)
TIBC SERPL-MCNC: 254 UG/DL (ref 260–445)
WBC # BLD AUTO: 5.9 K/UL (ref 4–11)

## 2023-09-04 PROCEDURE — 82103 ALPHA-1-ANTITRYPSIN TOTAL: CPT

## 2023-09-04 PROCEDURE — 82390 ASSAY OF CERULOPLASMIN: CPT

## 2023-09-04 PROCEDURE — 1200000000 HC SEMI PRIVATE

## 2023-09-04 PROCEDURE — 86015 ACTIN ANTIBODY EACH: CPT

## 2023-09-04 PROCEDURE — 6370000000 HC RX 637 (ALT 250 FOR IP)

## 2023-09-04 PROCEDURE — 80074 ACUTE HEPATITIS PANEL: CPT

## 2023-09-04 PROCEDURE — 86038 ANTINUCLEAR ANTIBODIES: CPT

## 2023-09-04 PROCEDURE — 2580000003 HC RX 258: Performed by: HOSPITALIST

## 2023-09-04 PROCEDURE — 6360000002 HC RX W HCPCS

## 2023-09-04 PROCEDURE — 83516 IMMUNOASSAY NONANTIBODY: CPT

## 2023-09-04 PROCEDURE — 2580000003 HC RX 258

## 2023-09-04 PROCEDURE — 85025 COMPLETE CBC W/AUTO DIFF WBC: CPT

## 2023-09-04 PROCEDURE — 84466 ASSAY OF TRANSFERRIN: CPT

## 2023-09-04 PROCEDURE — 89051 BODY FLUID CELL COUNT: CPT

## 2023-09-04 PROCEDURE — 2500000003 HC RX 250 WO HCPCS: Performed by: HOSPITALIST

## 2023-09-04 PROCEDURE — 85610 PROTHROMBIN TIME: CPT

## 2023-09-04 PROCEDURE — 82105 ALPHA-FETOPROTEIN SERUM: CPT

## 2023-09-04 PROCEDURE — 86376 MICROSOMAL ANTIBODY EACH: CPT

## 2023-09-04 PROCEDURE — 80048 BASIC METABOLIC PNL TOTAL CA: CPT

## 2023-09-04 PROCEDURE — 36415 COLL VENOUS BLD VENIPUNCTURE: CPT

## 2023-09-04 PROCEDURE — 83735 ASSAY OF MAGNESIUM: CPT

## 2023-09-04 RX ORDER — SODIUM CHLORIDE 0.9 % (FLUSH) 0.9 %
5-40 SYRINGE (ML) INJECTION 2 TIMES DAILY
Status: DISCONTINUED | OUTPATIENT
Start: 2023-09-04 | End: 2023-09-07 | Stop reason: HOSPADM

## 2023-09-04 RX ORDER — SODIUM CHLORIDE 0.9 % (FLUSH) 0.9 %
5-40 SYRINGE (ML) INJECTION PRN
Status: DISCONTINUED | OUTPATIENT
Start: 2023-09-04 | End: 2023-09-07 | Stop reason: HOSPADM

## 2023-09-04 RX ORDER — MAGNESIUM SULFATE IN WATER 40 MG/ML
2000 INJECTION, SOLUTION INTRAVENOUS ONCE
Status: COMPLETED | OUTPATIENT
Start: 2023-09-04 | End: 2023-09-04

## 2023-09-04 RX ORDER — LEVOTHYROXINE SODIUM 0.07 MG/1
75 TABLET ORAL DAILY
Status: DISCONTINUED | OUTPATIENT
Start: 2023-09-04 | End: 2023-09-07 | Stop reason: HOSPADM

## 2023-09-04 RX ADMIN — LACTULOSE 20 G: 20 SOLUTION ORAL at 20:29

## 2023-09-04 RX ADMIN — LACTULOSE 20 G: 20 SOLUTION ORAL at 08:09

## 2023-09-04 RX ADMIN — SODIUM CHLORIDE, POTASSIUM CHLORIDE, SODIUM LACTATE AND CALCIUM CHLORIDE: 600; 310; 30; 20 INJECTION, SOLUTION INTRAVENOUS at 20:49

## 2023-09-04 RX ADMIN — MICONAZOLE NITRATE: 2 POWDER TOPICAL at 20:29

## 2023-09-04 RX ADMIN — MICONAZOLE NITRATE: 2 POWDER TOPICAL at 11:36

## 2023-09-04 RX ADMIN — CEFTRIAXONE 1000 MG: 1 INJECTION, POWDER, FOR SOLUTION INTRAMUSCULAR; INTRAVENOUS at 15:45

## 2023-09-04 RX ADMIN — GABAPENTIN 400 MG: 400 CAPSULE ORAL at 08:09

## 2023-09-04 RX ADMIN — SODIUM CHLORIDE, POTASSIUM CHLORIDE, SODIUM LACTATE AND CALCIUM CHLORIDE: 600; 310; 30; 20 INJECTION, SOLUTION INTRAVENOUS at 07:33

## 2023-09-04 RX ADMIN — RIFAXIMIN 400 MG: 200 TABLET ORAL at 08:09

## 2023-09-04 RX ADMIN — LEVOTHYROXINE SODIUM 75 MCG: 0.07 TABLET ORAL at 07:13

## 2023-09-04 RX ADMIN — GABAPENTIN 400 MG: 400 CAPSULE ORAL at 20:29

## 2023-09-04 RX ADMIN — RIFAXIMIN 400 MG: 200 TABLET ORAL at 21:57

## 2023-09-04 RX ADMIN — SODIUM CHLORIDE, PRESERVATIVE FREE 10 ML: 5 INJECTION INTRAVENOUS at 20:29

## 2023-09-04 RX ADMIN — RIFAXIMIN 400 MG: 200 TABLET ORAL at 14:18

## 2023-09-04 RX ADMIN — LACTULOSE 20 G: 20 SOLUTION ORAL at 14:18

## 2023-09-04 RX ADMIN — MAGNESIUM SULFATE HEPTAHYDRATE 2000 MG: 40 INJECTION, SOLUTION INTRAVENOUS at 11:36

## 2023-09-04 ASSESSMENT — PAIN SCALES - GENERAL
PAINLEVEL_OUTOF10: 0
PAINLEVEL_OUTOF10: 0

## 2023-09-04 NOTE — CONSULTS
Clinical Pharmacy Consult Note  Medication History     Admit Date: 9/3/2023    Pharmacy consulted to verify home medication list by Dr. Florida Youngblood.    List of of current medications patient is taking is complete. Home Medication list in EPIC updated to reflect changes noted below. Source of information: Chart review, facility list, dispense report, patient interview    Patient's home pharmacy: [address] [place] [address] [address] [facility], [address] [place] [address]   Patient from [facility] and came with facility med list.    Changes made to medication list:   Medications removed: (include reason, ex: therapy completed, patient no longer taking, etc.)  Aspirin- patient no longer takes  Citalopram- patient no longer takes  Desmopressin- patient no longer takes  Medications added:   None  Medication doses adjusted:   Levothyroxine- now 75 mcg daily, previously 125 mcg daily  Other notes:   Patient states that she was not given any medication at the facility before presenting to [facility]. I attempted to call the facility twice to confirm last dose timing and was on hold for 30+ minutes with no answer. Current Outpatient Medications   Medication Instructions    acetaminophen (TYLENOL) 650 mg, Oral, EVERY 6 HOURS PRN    ALPRAZolam (XANAX) 2 mg, Oral, 2 TIMES DAILY    busPIRone (BUSPAR) 5 mg, Oral, 2 TIMES DAILY    DULoxetine (CYMBALTA) 60 mg, Oral, DAILY    gabapentin (NEURONTIN) 400 mg, Oral, 2 times daily    IBUPROFEN  mg, Oral, EVERY 8 HOURS PRN    levothyroxine (SYNTHROID) 75 mcg, Oral, DAILY    lidocaine 4 % external patch 1 patch, TransDERmal, DAILY, Apply to left knee    melatonin 10 mg, Oral, NIGHTLY    OLANZapine (ZYPREXA) 5 mg, Oral, 2 TIMES DAILY    oxyCODONE-acetaminophen (PERCOCET)  MG per tablet 1 tablet, Oral, EVERY 6 HOURS PRN    senna (SENOKOT) 8.6 mg, Oral, DAILY     Please call with any questions.     Deanne Quinones  PharmD Candidate [id number]  Main Pharmacy: [phone]

## 2023-09-04 NOTE — PROGRESS NOTES
Pt alert oriented to person and place, disoriented to time and situation. Pt was able to take pills whole w water, without any difficulty. Safety precaution in place.  Will cont to monitor

## 2023-09-04 NOTE — PROGRESS NOTES
ALT 38  --    BILITOT 4.2* 3.4*   ALKPHOS 147*  --          U/A:   Recent Labs     09/03/23  0853 09/03/23  1736   COLORU Yellow  --    PHUR 6.5 5.5   WBCUA 6-9*  --    RBCUA 5-10*  --    BACTERIA 4+*  --    CLARITYU Clear  --    SPECGRAV 1.020  --    LEUKOCYTESUR SMALL*  --    UROBILINOGEN >=8.0*  --    BILIRUBINUR SMALL*  --    BLOODU SMALL*  --    GLUCOSEU Negative  --    AMORPHOUS 2+  --        Radiology:  CT ABDOMEN PELVIS WO CONTRAST Additional Contrast? None   Final Result      1. Cirrhosis with sequela of portal hypertension including moderate volume ascites, splenomegaly, and anasarca. 2.  Mild apparent wall thickening at site of small bowel anastomosis in the lower abdomen. This may be related to incomplete bowel distention, inflammation, stricture, or may be chronic postsurgical. No associated obstruction. Electronically signed by Angeline Lopez MD      CT HEAD WO CONTRAST   Final Result      No evidence of acute intracranial abnormality. XR CHEST PORTABLE   Final Result      No acute disease         US LIVER    (Results Pending)         Assessment & Plan   Mindi Shrestha is a 65year old with pmhx of diabetes insipidus, panhypopitutiarism, recurrent episodes of hyponatremia presents from [address] [place] [address] facility due to her just not being herself. Acute encephalopathy 2/2 new onset cirrhosis   Patient was sent to the ED from nursing facility due to looking more yellow and incoherent. PE showed Jaundice, mydriasis, and AAOx1. Elevated , bilirubin of 4.2. Ammonia of 86. VBG showed ph 7.46, pCO2 38.1, Hco3 27.4. Lactulose was given in the ED. Ethanol, acetaminophen, and salicylate level normal. UDS positive for benzos and opioids. CT Abdomen pelvis showed cirrhosis with sequela of portal hypertension including moderate volume ascites, splenomegaly, and anasarca. Mild apparent wall thickening at site of small bowel anastomosis in the lower abdomen. Ferritin 249. -f/u alpha-1-antitrypsin, ceruloplasmin, afp tumor marker, hepatitis panel, iron and TIBC  -f/u autoimmune panel   -f/u liver ultrasound doppler   -f/u with paracentesis   -continue lactulose for 2-3 bowel movements daily and rifaximin  -consult GI for new onset cirrhosis      Asymptomatic UTI   Urinalysis showed 4+ bacteria and 6-9 WBC, she was given a dose of rocephin in the ED.  Denied any dysuria, frequency, hematuria.   -continue rocephin day 2 (9/3-9/9)  -f/u urine culture      Chronic Medical conditions:  Neuropathy: continue home medication of gabapentin, hold duloxetine  Hypothyroidism: continue home medication of levothyroxine  Mood disorder: hold xanax, olanzapine, and citalopram  Chronic back pain: hold percocet        DVT PPx: SCDs  Diet: Diet NPO Exceptions are: Sips of Water with Meds   Code status:  Full Code   Barriers to discharge: acute liver failure   Disposition: SNF    Will discuss with attending physician Dr. Tamiko Dasilva MD     _____________________  Franc Steiner MD   Internal Medicine Resident, PGY-1

## 2023-09-04 NOTE — PROGRESS NOTES
VSS, afebrile, pt currently denies having pain. Pt a/o to self only, is calm, cooperative, and follows all commands. Pt tolerated morning dose of lactulose. POC discussed with pt, questions/concerns addressed at this time, fall px in place.

## 2023-09-04 NOTE — CONSULTS
GI Consult Note      Admission Date: 9/3/2023  Hospital Day: Hospital Day: 2  Attending: Heath Gonzalez MD  Date of service: 9/4/23    Subjective:     Chief complaint/ Reason for consult:   Cirrhosis     HPI: Tomas Brito is a 65 y.o.  female patient, who was seen at the request of Dr. Heath Gonzalez MD.    History was obtained from chart review and the patient. 55-year-old female with a history of morbid obesity, recurrent hyponatremia, schizoaffective disorder who is admitted from her healthcare facility due to mental status change. She underwent a CT scan which showed cirrhosis, moderate ascites, mild wall thickening of the anastomosis of the small intestine and the lower abdomen. Urine tox screen was positive for benzodiazepines and oxycodone. INR was elevated 1.6. Ammonia was increased to 86. Her liver function test were increased as well. She has a long history of recurrent hyponatremia with unclear source as it was not known at the times that she had cirrhosis. She does have a history of gastric bypass but her BMI remains at 49. She has an established GI doctor in [place] where she lives and underwent a colonoscopy with him in 2019 with findings of internal hemorrhoids only           Past Endoscopic History:  Colonoscopy 2019 in [place]-internal hemorrhoids                    Past Medical History:     Past Medical History:   Diagnosis Date    Falls frequently     History of opioid abuse ([address])     Hypomagnesemia     Hyponatremia     Hypopituitarism ([address])     Hypothyroidism     Insomnia     Lack of coordination     Legal blindness     Morbid obesity ([address])     Pain syndrome, chronic     Sleep apnea     Venous insufficiency     Weakness        Past Surgical History:    Past Surgical History:   Procedure Laterality Date    GASTRIC BYPASS SURGERY           Social History:     Tobacco use:   reports that she has never smoked. She has never been exposed to tobacco smoke.  She has never used

## 2023-09-05 ENCOUNTER — APPOINTMENT (OUTPATIENT)
Dept: VASCULAR LAB | Age: 58
End: 2023-09-05
Payer: MEDICARE

## 2023-09-05 ENCOUNTER — APPOINTMENT (OUTPATIENT)
Dept: ULTRASOUND IMAGING | Age: 58
End: 2023-09-05
Payer: MEDICARE

## 2023-09-05 LAB
ANA SER QL IA: NEGATIVE
ANION GAP SERPL CALCULATED.3IONS-SCNC: 12 MMOL/L (ref 3–16)
APPEARANCE FLUID: CLEAR
BASOPHILS # BLD: 0.1 K/UL (ref 0–0.2)
BASOPHILS NFR BLD: 0.7 %
BDY FLUID QUALITY: NORMAL
BUN SERPL-MCNC: 15 MG/DL (ref 7–20)
CALCIUM SERPL-MCNC: 8.7 MG/DL (ref 8.3–10.6)
CELL COUNT FLUID TYPE: NORMAL
CHLORIDE SERPL-SCNC: 99 MMOL/L (ref 99–110)
CO2 SERPL-SCNC: 22 MMOL/L (ref 21–32)
COLOR FLUID: YELLOW
CREAT SERPL-MCNC: <0.5 MG/DL (ref 0.6–1.1)
DEPRECATED RDW RBC AUTO: 15.7 % (ref 12.4–15.4)
EOSINOPHIL # BLD: 0.2 K/UL (ref 0–0.6)
EOSINOPHIL NFR BLD: 2.9 %
GFR SERPLBLD CREATININE-BSD FMLA CKD-EPI: >60 ML/MIN/{1.73_M2}
GLUCOSE BLD-MCNC: 95 MG/DL (ref 70–99)
GLUCOSE SERPL-MCNC: 99 MG/DL (ref 70–99)
HCT VFR BLD AUTO: 31.3 % (ref 36–48)
HGB BLD-MCNC: 11 G/DL (ref 12–16)
LYMPHOCYTES # BLD: 2.2 K/UL (ref 1–5.1)
LYMPHOCYTES NFR BLD: 28.3 %
LYMPHOCYTES NFR FLD: 26 %
MACROPHAGES # FLD: 3 %
MAGNESIUM SERPL-MCNC: 1.9 MG/DL (ref 1.8–2.4)
MCH RBC QN AUTO: 33.8 PG (ref 26–34)
MCHC RBC AUTO-ENTMCNC: 35.1 G/DL (ref 31–36)
MCV RBC AUTO: 96.4 FL (ref 80–100)
MESOTHL CELL NFR FLD: 3 %
MONOCYTES # BLD: 1.3 K/UL (ref 0–1.3)
MONOCYTES NFR BLD: 17.5 %
MONOCYTES NFR FLD: 43 %
NEUTROPHIL, FLUID: 25 %
NEUTROPHILS # BLD: 3.9 K/UL (ref 1.7–7.7)
NEUTROPHILS NFR BLD: 50.6 %
NUC CELL # FLD: 235 /CUMM
PERFORMED ON: NORMAL
PLATELET # BLD AUTO: 175 K/UL (ref 135–450)
PMV BLD AUTO: 7.9 FL (ref 5–10.5)
POTASSIUM SERPL-SCNC: 4 MMOL/L (ref 3.5–5.1)
RBC # BLD AUTO: 3.25 M/UL (ref 4–5.2)
RBC FLUID: <1000 /CUMM
SODIUM SERPL-SCNC: 133 MMOL/L (ref 136–145)
TOTAL CELLS COUNTED FLD: 100
WBC # BLD AUTO: 7.7 K/UL (ref 4–11)

## 2023-09-05 PROCEDURE — 2580000003 HC RX 258

## 2023-09-05 PROCEDURE — 2709999900 US GUIDED PARACENTESIS

## 2023-09-05 PROCEDURE — 88112 CYTOPATH CELL ENHANCE TECH: CPT

## 2023-09-05 PROCEDURE — 87070 CULTURE OTHR SPECIMN AEROBIC: CPT

## 2023-09-05 PROCEDURE — 83735 ASSAY OF MAGNESIUM: CPT

## 2023-09-05 PROCEDURE — 82042 OTHER SOURCE ALBUMIN QUAN EA: CPT

## 2023-09-05 PROCEDURE — 83615 LACTATE (LD) (LDH) ENZYME: CPT

## 2023-09-05 PROCEDURE — 76705 ECHO EXAM OF ABDOMEN: CPT

## 2023-09-05 PROCEDURE — 36415 COLL VENOUS BLD VENIPUNCTURE: CPT

## 2023-09-05 PROCEDURE — 80048 BASIC METABOLIC PNL TOTAL CA: CPT

## 2023-09-05 PROCEDURE — 6370000000 HC RX 637 (ALT 250 FOR IP)

## 2023-09-05 PROCEDURE — 88305 TISSUE EXAM BY PATHOLOGIST: CPT

## 2023-09-05 PROCEDURE — 82150 ASSAY OF AMYLASE: CPT

## 2023-09-05 PROCEDURE — 6360000002 HC RX W HCPCS

## 2023-09-05 PROCEDURE — 85025 COMPLETE CBC W/AUTO DIFF WBC: CPT

## 2023-09-05 PROCEDURE — 87205 SMEAR GRAM STAIN: CPT

## 2023-09-05 PROCEDURE — 82945 GLUCOSE OTHER FLUID: CPT

## 2023-09-05 PROCEDURE — 0W9G3ZX DRAINAGE OF PERITONEAL CAVITY, PERCUTANEOUS APPROACH, DIAGNOSTIC: ICD-10-PCS | Performed by: INTERNAL MEDICINE

## 2023-09-05 PROCEDURE — 2580000003 HC RX 258: Performed by: HOSPITALIST

## 2023-09-05 PROCEDURE — 93970 EXTREMITY STUDY: CPT

## 2023-09-05 PROCEDURE — 84157 ASSAY OF PROTEIN OTHER: CPT

## 2023-09-05 PROCEDURE — 86015 ACTIN ANTIBODY EACH: CPT

## 2023-09-05 PROCEDURE — 1200000000 HC SEMI PRIVATE

## 2023-09-05 RX ORDER — SODIUM CHLORIDE 9 MG/ML
INJECTION, SOLUTION INTRAVENOUS CONTINUOUS
Status: DISCONTINUED | OUTPATIENT
Start: 2023-09-05 | End: 2023-09-05

## 2023-09-05 RX ADMIN — SODIUM CHLORIDE, PRESERVATIVE FREE 10 ML: 5 INJECTION INTRAVENOUS at 22:01

## 2023-09-05 RX ADMIN — GABAPENTIN 400 MG: 400 CAPSULE ORAL at 10:22

## 2023-09-05 RX ADMIN — LACTULOSE 20 G: 20 SOLUTION ORAL at 18:12

## 2023-09-05 RX ADMIN — MICONAZOLE NITRATE: 2 POWDER TOPICAL at 10:35

## 2023-09-05 RX ADMIN — GABAPENTIN 400 MG: 400 CAPSULE ORAL at 22:01

## 2023-09-05 RX ADMIN — RIFAXIMIN 400 MG: 200 TABLET ORAL at 22:02

## 2023-09-05 RX ADMIN — LACTULOSE 20 G: 20 SOLUTION ORAL at 22:01

## 2023-09-05 RX ADMIN — SODIUM CHLORIDE, PRESERVATIVE FREE 10 ML: 5 INJECTION INTRAVENOUS at 10:27

## 2023-09-05 RX ADMIN — RIFAXIMIN 400 MG: 200 TABLET ORAL at 18:12

## 2023-09-05 RX ADMIN — MICONAZOLE NITRATE: 2 POWDER TOPICAL at 22:06

## 2023-09-05 RX ADMIN — RIFAXIMIN 400 MG: 200 TABLET ORAL at 10:26

## 2023-09-05 RX ADMIN — LACTULOSE 20 G: 20 SOLUTION ORAL at 10:25

## 2023-09-05 RX ADMIN — SODIUM CHLORIDE: 9 INJECTION, SOLUTION INTRAVENOUS at 10:24

## 2023-09-05 RX ADMIN — CEFTRIAXONE 1000 MG: 1 INJECTION, POWDER, FOR SOLUTION INTRAMUSCULAR; INTRAVENOUS at 18:19

## 2023-09-05 ASSESSMENT — PAIN DESCRIPTION - ONSET: ONSET: ON-GOING

## 2023-09-05 ASSESSMENT — PAIN DESCRIPTION - FREQUENCY: FREQUENCY: CONTINUOUS

## 2023-09-05 ASSESSMENT — PAIN SCALES - GENERAL
PAINLEVEL_OUTOF10: 0

## 2023-09-05 NOTE — PROGRESS NOTES
Comprehensive Nutrition Assessment    RECOMMENDATIONS:  PO Diet: NPO  ONS: NPO  Nutrition Education: Education not indicated     NUTRITION ASSESSMENT:   Nutritional summary & status: +IP r/t wounds indicative of increased pro needs r/t wound healing. Pt currently NPO, would rec'd adding wound healing supplement once diet is advanced. Noted pt is a poor historian and unable to provide hx regarding wt hx. Suspect scale error d/t deviation in wt trend noted per chart review. Will base EEN off of admit wt. Last BM on 9/4, all labs reviewed. Continue to monitor pt per [facility]; RD following. Admission // PMH: PMHx of diabetes insipidus, panhypopitutiarism, recurrent episodes of hyponatremia, schizoaffective disorder, depression, generalized anxiety disorder, chronic back pain, peripheral neuropathy presents from [address] [place] [address] facility due to her just not being herself    MALNUTRITION ASSESSMENT  Context of Malnutrition: Acute Illness   Malnutrition Status: Insufficient data  Findings of the 6 clinical characteristics of malnutrition (Minimum of 2 out of 6 clinical characteristics is required to make the diagnosis of moderate or severe Protein Calorie Malnutrition based on AND/ASPEN Guidelines):  Energy Intake:  Unable to assess  Weight Loss:  Unable to assess     Body Fat Loss:  No significant body fat loss     Muscle Mass Loss:  No significant muscle mass loss      NUTRITION DIAGNOSIS   Increased nutrient needs related to increase demand for energy/nutrients as evidenced by wounds    Nutrition Monitoring and Evaluation:   Food/Nutrient Intake Outcomes:  Diet Advancement/Tolerance  Physical Signs/Symptoms Outcomes:  Biochemical Data, Weight, Skin, Hemodynamic Status     OBJECTIVE DATA: Significant to nutrition assessment  Nutrition Related Findings: +BM 9/4. Non-pitting BLE edema, +1.  Na 133, Glu 138  Wounds: Multiple, Pressure Injury, Stage II, Unstageable (nonstageable noted to 3rd toe, stage II noted to

## 2023-09-05 NOTE — CARE COORDINATION
Case Management Assessment  Initial Evaluation    Date/Time of Evaluation: 9/5/2023 1:40 PM  Assessment Completed by: Pinky Milton RN    If patient is discharged prior to next notation, then this note serves as note for discharge by case management. Patient Name: Tigist Balderas                   YOB: 1965  Diagnosis: Hepatic encephalopathy ([address]) [K76.82]  Altered mental status [R41.82]  Urinary tract infection without hematuria, site unspecified [N39.0]  AMS (altered mental status) [R41.82]                   Date / Time: 9/3/2023  8:38 AM    Patient Admission Status: Inpatient   Readmission Risk (Low < 19, Mod (19-27), High > 27): Readmission Risk Score: 14    Current PCP: Rut Li MD  PCP verified by ? Yes    Chart Reviewed: Yes      History Provided by: Medical Record  Patient Orientation: Person    Patient Cognition: Severely Impaired    Hospitalization in the last 30 days (Readmission):  No    If yes, Readmission Assessment in CM Navigator will be completed. Advance Directives:      Code Status: Full Code   Patient's Primary Decision Maker is: Patient Declined (Legal Next of Kin Remains as Decision Maker)      Discharge Planning:    Patient lives with: Alone Type of Home: Long-Term Care  Primary Care Giver:  Other (Comment) (LTC)  Patient Support Systems include: Family Members, Friends/Neighbors   Current Financial resources: Medicare  Current community resources:    Current services prior to admission: Extended Care Facility            Current DME:              Type of Home Care services:  None    ADLS  Prior functional level: Assistance with the following:, Bathing, Dressing, Toileting, Cooking, Housework, Shopping, Mobility  Current functional level: Assistance with the following:, Bathing, Dressing, Toileting, Cooking, Housework, Shopping, Mobility    PT AM-PAC:   /24  OT AM-PAC:   /24    Family can provide assistance at DC: No  Would you like Case Management to discuss the

## 2023-09-05 NOTE — PROGRESS NOTES
Physical Therapy/Occupational Therapy  Discharge note    Referral received, chart reviewed. Upon discussion with , pt is from [address] and will return at discharge. Will defer any PT and OT needs back to LTC facility. RN aware.        Lawanda Hayes, [facility], OTR/L 5741

## 2023-09-05 NOTE — PROGRESS NOTES
Patient is alert to self and forgetful to place, time, and situation. No complaints of pain or SOB. Vitals stable as charted. Respirations appear to be easy and unlabored. Lungs clear. Respirations easy with no complaints of cough. Cardiac monitor in place, current rhythm NSR to sinus tachycardia at times. Patient remains asymptomatic with elevated heart rate. No complaints of nausea/vomiting/diarrhea. Up with lift to the bathroom or chair as needed. Incontinent of urine, pure wick in place. Dressings to posterior thighs intact. Tolerating regular diet. IVF infusing per right upper arm PIV as ordered. Plan of care and safety measures reviewed with the patient. Call light in reach and bed alarm in place. Bed hooked to wall for bed alarm purposes. Will continue to monitor.   Electronically signed by Bonny Sena RN on 9/4/2023 at 11:46 PM

## 2023-09-06 LAB
ALBUMIN FLD-MCNC: 0.3 G/DL
AMYLASE FLD-CCNC: <3 U/L
ANION GAP SERPL CALCULATED.3IONS-SCNC: 14 MMOL/L (ref 3–16)
BASOPHILS # BLD: 0.1 K/UL (ref 0–0.2)
BASOPHILS NFR BLD: 0.8 %
BUN SERPL-MCNC: 13 MG/DL (ref 7–20)
CALCIUM SERPL-MCNC: 9 MG/DL (ref 8.3–10.6)
CHLORIDE SERPL-SCNC: 95 MMOL/L (ref 99–110)
CO2 SERPL-SCNC: 19 MMOL/L (ref 21–32)
CREAT SERPL-MCNC: <0.5 MG/DL (ref 0.6–1.1)
DEPRECATED RDW RBC AUTO: 15.6 % (ref 12.4–15.4)
EOSINOPHIL # BLD: 0.3 K/UL (ref 0–0.6)
EOSINOPHIL NFR BLD: 3.3 %
GFR SERPLBLD CREATININE-BSD FMLA CKD-EPI: >60 ML/MIN/{1.73_M2}
GLUCOSE BLD-MCNC: 119 MG/DL (ref 70–99)
GLUCOSE BLD-MCNC: 94 MG/DL (ref 70–99)
GLUCOSE FLD-MCNC: 109 MG/DL
GLUCOSE SERPL-MCNC: 99 MG/DL (ref 70–99)
HCT VFR BLD AUTO: 35.3 % (ref 36–48)
HGB BLD-MCNC: 12.2 G/DL (ref 12–16)
LDH FLD L TO P-CCNC: 46 U/L
LKM-1 IGG SER IA-ACNC: 1.1 U (ref 0–24.9)
LYMPHOCYTES # BLD: 2.2 K/UL (ref 1–5.1)
LYMPHOCYTES NFR BLD: 27.6 %
MAGNESIUM SERPL-MCNC: 1.9 MG/DL (ref 1.8–2.4)
MCH RBC QN AUTO: 33.6 PG (ref 26–34)
MCHC RBC AUTO-ENTMCNC: 34.5 G/DL (ref 31–36)
MCV RBC AUTO: 97.2 FL (ref 80–100)
MONOCYTES # BLD: 1.3 K/UL (ref 0–1.3)
MONOCYTES NFR BLD: 16 %
NEUTROPHILS # BLD: 4.1 K/UL (ref 1.7–7.7)
NEUTROPHILS NFR BLD: 52.3 %
PERFORMED ON: ABNORMAL
PERFORMED ON: NORMAL
PLATELET # BLD AUTO: 160 K/UL (ref 135–450)
PMV BLD AUTO: 7.9 FL (ref 5–10.5)
POTASSIUM SERPL-SCNC: 3.8 MMOL/L (ref 3.5–5.1)
PROT FLD-MCNC: 0.4 G/DL
RBC # BLD AUTO: 3.63 M/UL (ref 4–5.2)
SMA IGG SER-ACNC: 16 UNITS (ref 0–19)
SMA IGG SER-ACNC: 19 UNITS (ref 0–19)
SODIUM SERPL-SCNC: 128 MMOL/L (ref 136–145)
SPECIMEN SOURCE FLD: NORMAL
WBC # BLD AUTO: 7.9 K/UL (ref 4–11)

## 2023-09-06 PROCEDURE — 6370000000 HC RX 637 (ALT 250 FOR IP)

## 2023-09-06 PROCEDURE — 36415 COLL VENOUS BLD VENIPUNCTURE: CPT

## 2023-09-06 PROCEDURE — 6360000002 HC RX W HCPCS

## 2023-09-06 PROCEDURE — 83735 ASSAY OF MAGNESIUM: CPT

## 2023-09-06 PROCEDURE — 85025 COMPLETE CBC W/AUTO DIFF WBC: CPT

## 2023-09-06 PROCEDURE — 1200000000 HC SEMI PRIVATE

## 2023-09-06 PROCEDURE — 80048 BASIC METABOLIC PNL TOTAL CA: CPT

## 2023-09-06 PROCEDURE — 2580000003 HC RX 258

## 2023-09-06 PROCEDURE — 2580000003 HC RX 258: Performed by: HOSPITALIST

## 2023-09-06 PROCEDURE — 87086 URINE CULTURE/COLONY COUNT: CPT

## 2023-09-06 RX ORDER — BUSPIRONE HYDROCHLORIDE 10 MG/1
5 TABLET ORAL 2 TIMES DAILY
Status: DISCONTINUED | OUTPATIENT
Start: 2023-09-06 | End: 2023-09-07 | Stop reason: HOSPADM

## 2023-09-06 RX ORDER — LACTULOSE 10 G/15ML
30 SOLUTION ORAL 3 TIMES DAILY
Status: DISCONTINUED | OUTPATIENT
Start: 2023-09-06 | End: 2023-09-07 | Stop reason: HOSPADM

## 2023-09-06 RX ORDER — DULOXETIN HYDROCHLORIDE 60 MG/1
60 CAPSULE, DELAYED RELEASE ORAL DAILY
Status: DISCONTINUED | OUTPATIENT
Start: 2023-09-06 | End: 2023-09-07 | Stop reason: HOSPADM

## 2023-09-06 RX ORDER — SPIRONOLACTONE 50 MG/1
50 TABLET, FILM COATED ORAL DAILY
Status: DISCONTINUED | OUTPATIENT
Start: 2023-09-06 | End: 2023-09-06

## 2023-09-06 RX ORDER — FUROSEMIDE 20 MG/1
20 TABLET ORAL DAILY
Status: DISCONTINUED | OUTPATIENT
Start: 2023-09-06 | End: 2023-09-06

## 2023-09-06 RX ADMIN — LACTULOSE 30 G: 20 SOLUTION ORAL at 22:47

## 2023-09-06 RX ADMIN — SODIUM CHLORIDE, PRESERVATIVE FREE 5 ML: 5 INJECTION INTRAVENOUS at 22:45

## 2023-09-06 RX ADMIN — FUROSEMIDE 20 MG: 20 TABLET ORAL at 11:16

## 2023-09-06 RX ADMIN — GABAPENTIN 400 MG: 400 CAPSULE ORAL at 11:05

## 2023-09-06 RX ADMIN — BUSPIRONE HYDROCHLORIDE 5 MG: 10 TABLET ORAL at 22:44

## 2023-09-06 RX ADMIN — MICONAZOLE NITRATE: 2 POWDER TOPICAL at 22:44

## 2023-09-06 RX ADMIN — SODIUM CHLORIDE, PRESERVATIVE FREE 10 ML: 5 INJECTION INTRAVENOUS at 11:10

## 2023-09-06 RX ADMIN — RIFAXIMIN 400 MG: 200 TABLET ORAL at 11:07

## 2023-09-06 RX ADMIN — RIFAXIMIN 400 MG: 200 TABLET ORAL at 22:44

## 2023-09-06 RX ADMIN — DULOXETINE HYDROCHLORIDE 60 MG: 60 CAPSULE, DELAYED RELEASE ORAL at 12:26

## 2023-09-06 RX ADMIN — BUSPIRONE HYDROCHLORIDE 5 MG: 10 TABLET ORAL at 12:26

## 2023-09-06 RX ADMIN — GABAPENTIN 400 MG: 400 CAPSULE ORAL at 22:44

## 2023-09-06 RX ADMIN — RIFAXIMIN 400 MG: 200 TABLET ORAL at 15:47

## 2023-09-06 RX ADMIN — MICONAZOLE NITRATE: 2 POWDER TOPICAL at 11:08

## 2023-09-06 RX ADMIN — LEVOTHYROXINE SODIUM 75 MCG: 0.07 TABLET ORAL at 06:09

## 2023-09-06 RX ADMIN — CEFTRIAXONE 1000 MG: 1 INJECTION, POWDER, FOR SOLUTION INTRAMUSCULAR; INTRAVENOUS at 15:48

## 2023-09-06 RX ADMIN — LACTULOSE 30 G: 20 SOLUTION ORAL at 15:48

## 2023-09-06 ASSESSMENT — PAIN SCALES - GENERAL
PAINLEVEL_OUTOF10: 7
PAINLEVEL_OUTOF10: 0

## 2023-09-06 ASSESSMENT — PAIN DESCRIPTION - PAIN TYPE: TYPE: ACUTE PAIN

## 2023-09-06 ASSESSMENT — PAIN DESCRIPTION - DESCRIPTORS: DESCRIPTORS: ACHING

## 2023-09-06 ASSESSMENT — PAIN DESCRIPTION - LOCATION: LOCATION: BACK

## 2023-09-06 ASSESSMENT — PAIN DESCRIPTION - ORIENTATION: ORIENTATION: LOWER

## 2023-09-06 ASSESSMENT — PAIN - FUNCTIONAL ASSESSMENT: PAIN_FUNCTIONAL_ASSESSMENT: ACTIVITIES ARE NOT PREVENTED

## 2023-09-06 ASSESSMENT — PAIN DESCRIPTION - ONSET: ONSET: GRADUAL

## 2023-09-06 ASSESSMENT — PAIN DESCRIPTION - FREQUENCY: FREQUENCY: INTERMITTENT

## 2023-09-06 NOTE — DISCHARGE SUMMARY
INTERNAL MEDICINE DEPARTMENT AT [address] [place] [address]  DISCHARGE SUMMARY    Patient ID: Zena Bolanos                                             Discharge Date: 9/6/2023   Patient's PCP: Angely Ness MD                                          Discharge Physician: Vince Kawasaki, MD MD  Admit Date: 9/3/2023   Admitting Physician: Paz Jarvis DO    PROBLEMS DURING HOSPITALIZATION:  Present on Admission:   AMS (altered mental status)   Altered mental status      DISCHARGE DIAGNOSES:    HPI:  Zena Bolanos is a 65year old with pmhx of diabetes insipidus, panhypopitutiarism, recurrent episodes of hyponatremia, schizoaffective disorder, depression, generalized anxiety disorder, chronic back pain, peripheral neuropathy presents from [address] [place] [address] due to her just not being herself. Patient was a poor historian. Talked to the [facility] healthcare, patient was not coherent and looked yellow so they decided to bring her to the ED. The patient was mostly concerned about her nurse and her boyfriend was not giving us an important history regarding why she arrived to the hospital.      Of note, patient had multiple ED visits throughout this year for episodes of feeling like her sodium was low, chest pain, and altered mental status but no history of any liver condition. At the ED she said she was complaining of feeling very fatigued and generally unwell. She stated that she hit her head and described a left sided headache. Denied any URI, SOB, chest pain, nausea, vomiting, abdominal pain. Vitals were 101/54, temp 97.9, pulse 86, respirations 16, SpO2 96%. Troponin level mildly elevated but down trended. EKG with no ischemic findings. CXR showed no pulmonary edema. Head CT showed no acute abnormality. CMP showed elevated , bilirubin of 4.2. Lactic acid normal of 1.2. Ammonia of 86. A dose of lactulose was given. Urinalysis showed 4+ bacteria and 6-9 WBC, she was given a dose of rocephin in the ED.

## 2023-09-06 NOTE — PROGRESS NOTES
Patient's sister, Megan Richardson, called with requests for updates on patient's plan of care and current status. All questions answered. Megan Richardson would like to be contacted with any changes at [phone].        Electronically signed by Laurie Chan RN on 9/5/2023 at 8:15 PM

## 2023-09-06 NOTE — DISCHARGE INSTRUCTIONS
Schedule a follow up appointment with your primary care physician in a week. Schedule an appointment with a gastroenterologist in a week. Start taking rifaximin and titrate lactulose for 2-3 bowel movements daily. Have patient take a BMP on Monday (9/11/23) and if sodium is within normal limits, then start taking lasix 40 and aldactone 100 mg.

## 2023-09-06 NOTE — PROGRESS NOTES
Patient had a large, brown bowel movement. Patient is oriented to person but disoriented to place, time, and situation. Purewick replaced on patient. Patient is requesting her medications at this time.      Electronically signed by Denia Marino RN on 9/5/2023 at 9:51 PM

## 2023-09-06 NOTE — PROGRESS NOTES
Consulted for pre-existing Stage 2 pressure injuries on L buttock and R posterior thigh. Wound care orders placed. Wound Care to sign off. Please re-consult for changes or deterioration.

## 2023-09-07 VITALS
SYSTOLIC BLOOD PRESSURE: 97 MMHG | TEMPERATURE: 98.3 F | HEIGHT: 64 IN | HEART RATE: 95 BPM | RESPIRATION RATE: 16 BRPM | OXYGEN SATURATION: 97 % | BODY MASS INDEX: 48.74 KG/M2 | WEIGHT: 285.5 LBS | DIASTOLIC BLOOD PRESSURE: 59 MMHG

## 2023-09-07 LAB
A1AT SERPL-MCNC: 142 MG/DL (ref 90–200)
AFP-TM SERPL-MCNC: 2.5 UG/L
ANION GAP SERPL CALCULATED.3IONS-SCNC: 8 MMOL/L (ref 3–16)
BACTERIA UR CULT: NORMAL
BASOPHILS # BLD: 0.1 K/UL (ref 0–0.2)
BASOPHILS NFR BLD: 0.9 %
BUN SERPL-MCNC: 10 MG/DL (ref 7–20)
CALCIUM SERPL-MCNC: 8.7 MG/DL (ref 8.3–10.6)
CERULOPLASMIN SERPL-MCNC: 16 MG/DL (ref 16–45)
CHLORIDE SERPL-SCNC: 99 MMOL/L (ref 99–110)
CO2 SERPL-SCNC: 25 MMOL/L (ref 21–32)
CREAT SERPL-MCNC: <0.5 MG/DL (ref 0.6–1.1)
DEPRECATED RDW RBC AUTO: 15.3 % (ref 12.4–15.4)
EOSINOPHIL # BLD: 0.3 K/UL (ref 0–0.6)
EOSINOPHIL NFR BLD: 4.4 %
GFR SERPLBLD CREATININE-BSD FMLA CKD-EPI: >60 ML/MIN/{1.73_M2}
GLUCOSE BLD-MCNC: 91 MG/DL (ref 70–99)
GLUCOSE BLD-MCNC: 94 MG/DL (ref 70–99)
GLUCOSE BLD-MCNC: 99 MG/DL (ref 70–99)
GLUCOSE SERPL-MCNC: 93 MG/DL (ref 70–99)
HCT VFR BLD AUTO: 30.9 % (ref 36–48)
HGB BLD-MCNC: 10.8 G/DL (ref 12–16)
LYMPHOCYTES # BLD: 2.1 K/UL (ref 1–5.1)
LYMPHOCYTES NFR BLD: 30.3 %
MAGNESIUM SERPL-MCNC: 1.8 MG/DL (ref 1.8–2.4)
MCH RBC QN AUTO: 33.6 PG (ref 26–34)
MCHC RBC AUTO-ENTMCNC: 35 G/DL (ref 31–36)
MCV RBC AUTO: 96.1 FL (ref 80–100)
MONOCYTES # BLD: 1.2 K/UL (ref 0–1.3)
MONOCYTES NFR BLD: 17.5 %
NEUTROPHILS # BLD: 3.3 K/UL (ref 1.7–7.7)
NEUTROPHILS NFR BLD: 46.9 %
PERFORMED ON: NORMAL
PLATELET # BLD AUTO: 168 K/UL (ref 135–450)
PMV BLD AUTO: 7.3 FL (ref 5–10.5)
POTASSIUM SERPL-SCNC: 3.7 MMOL/L (ref 3.5–5.1)
RBC # BLD AUTO: 3.21 M/UL (ref 4–5.2)
SODIUM SERPL-SCNC: 132 MMOL/L (ref 136–145)
TRANSFERRIN SERPL-MCNC: 149 MG/DL (ref 200–360)
WBC # BLD AUTO: 7.1 K/UL (ref 4–11)

## 2023-09-07 PROCEDURE — 83735 ASSAY OF MAGNESIUM: CPT

## 2023-09-07 PROCEDURE — 80048 BASIC METABOLIC PNL TOTAL CA: CPT

## 2023-09-07 PROCEDURE — 2580000003 HC RX 258: Performed by: HOSPITALIST

## 2023-09-07 PROCEDURE — 85025 COMPLETE CBC W/AUTO DIFF WBC: CPT

## 2023-09-07 PROCEDURE — 36415 COLL VENOUS BLD VENIPUNCTURE: CPT

## 2023-09-07 PROCEDURE — 6370000000 HC RX 637 (ALT 250 FOR IP)

## 2023-09-07 RX ORDER — LACTULOSE 10 G/15ML
30 SOLUTION ORAL 3 TIMES DAILY
Qty: 90 EACH | Refills: 1 | Status: SHIPPED | OUTPATIENT
Start: 2023-09-07 | End: 2023-09-07 | Stop reason: SDUPTHER

## 2023-09-07 RX ORDER — LACTULOSE 10 G/15ML
30 SOLUTION ORAL 3 TIMES DAILY
Qty: 90 EACH | Refills: 1 | Status: SHIPPED | OUTPATIENT
Start: 2023-09-07

## 2023-09-07 RX ADMIN — LEVOTHYROXINE SODIUM 75 MCG: 0.07 TABLET ORAL at 06:46

## 2023-09-07 RX ADMIN — GABAPENTIN 400 MG: 400 CAPSULE ORAL at 08:37

## 2023-09-07 RX ADMIN — RIFAXIMIN 400 MG: 200 TABLET ORAL at 13:40

## 2023-09-07 RX ADMIN — LACTULOSE 30 G: 20 SOLUTION ORAL at 13:40

## 2023-09-07 RX ADMIN — SODIUM CHLORIDE, PRESERVATIVE FREE 10 ML: 5 INJECTION INTRAVENOUS at 08:36

## 2023-09-07 RX ADMIN — DULOXETINE HYDROCHLORIDE 60 MG: 60 CAPSULE, DELAYED RELEASE ORAL at 08:37

## 2023-09-07 RX ADMIN — RIFAXIMIN 400 MG: 200 TABLET ORAL at 08:41

## 2023-09-07 RX ADMIN — MICONAZOLE NITRATE: 2 POWDER TOPICAL at 08:37

## 2023-09-07 RX ADMIN — BUSPIRONE HYDROCHLORIDE 5 MG: 10 TABLET ORAL at 08:37

## 2023-09-07 ASSESSMENT — PAIN SCALES - GENERAL
PAINLEVEL_OUTOF10: 0
PAINLEVEL_OUTOF10: 0

## 2023-09-07 NOTE — PLAN OF CARE
General Internal Medicine Attending     Chart and data reviewed. Patient seen and examined, case discussed with medical resident. Physical exam repeated. Labs and imaging studies reviewed. Agree with documentation, assessment and plan as outlined above. Acute encephalopathy likely sec to hepatic encephalopathy and possible UTI : POA     Decompensated liver cirrhosis, with ascites, portal HTN: POA  New diagnosis     Coagulopathy sec to cirrhosis POA     Complicated UTI: POA     Stage 2 pressure injuries on L buttock and R posterior thigh: POA              - Etiology of her cirrhosis is probably progression of LOYOLA cirrhosis     - Paracentesis: therapeutic and diagnostic: S/p 2 liters of clear yellow ascites removal 9/5/23.      - Should be on lasix/aldactone as soon as can be tolerated. - Cont lactulose and Rifximin     - Urine cx not sent. Appears as did not reflex culture. - Received ceftriaxone for UTI. Considered POA based on UA    - Cx was neg, but sent after antibiotics      - F/u with GI; EGD as o/p     - LE duplex with bilateral edema, and calf tenderness: limited but negative. - DVT prophylaxis        Disposition: From [address]; to likely return at OR    OK to OR.      Tami Jimenez MD

## 2023-09-07 NOTE — PLAN OF CARE
Problem: Safety - Adult  Goal: Free from fall injury  Outcome: Progressing  Flowsheets (Taken 9/7/2023 0401)  Free From Fall Injury: Instruct family/caregiver on patient safety     Problem: Pain  Goal: Verbalizes/displays adequate comfort level or baseline comfort level  Outcome: Progressing  Flowsheets (Taken 9/7/2023 0401)  Verbalizes/displays adequate comfort level or baseline comfort level:   Assess pain using appropriate pain scale   Encourage patient to monitor pain and request assistance   Administer analgesics based on type and severity of pain and evaluate response

## 2023-09-07 NOTE — PROGRESS NOTES
Patient refused lactulose this AM. Education provided but continues to refuse. States \" will take later\".  Electronically signed by Therese Diana RN on 9/7/2023 at 8:45 AM

## 2023-09-07 NOTE — CARE COORDINATION
Case Management Assessment            Discharge Note                    Date / Time of Note: 9/7/2023 11:40 AM                  Discharge Note Completed by: Edwardo Lopes RN    Patient Name: Caleb Meyer   YOB: 1965  Diagnosis: Hepatic encephalopathy ([address]) [K76.82]  Altered mental status [R41.82]  Urinary tract infection without hematuria, site unspecified [N39.0]  AMS (altered mental status) [R41.82]   Date / Time: 9/3/2023  8:38 AM    Current PCP: Hipolito Mariscal MD  Clinic patient: Yes    Hospitalization in the last 30 days: No    Advance Directives:  Code Status: Full Code  [place] DNR form completed and on chart: Not Indicated    Financial:  Payor: Alicia Palafox I-SNP / Plan: Alicia Palafox I-SNP / Product Type: *No Product type* /      Pharmacy:    Renetta Foy [address] [place] [address], [address]  [phone] F [phone]  [address] [place] [address]  Phone: [phone] Fax: [phone]      Assistance purchasing medications?: Potential Assistance Purchasing Medications: No  Assistance provided by Case Management: None at this time    Does patient want to participate in local refill/ meds to beds program?: No    Meds To Beds General Rules:  1. Can ONLY be done Monday- Friday between 8:30am-5pm  2. Prescription(s) must be in pharmacy by 3pm to be filled same day  3. Copy of patient's insurance/ prescription drug card and patient face sheet must be sent along with the prescription(s)  4. Cost of Rx cannot be added to hospital bill. If financial assistance is needed, please contact unit  or ;  or  CANNOT provide pharmacy voucher for patients co-pays  5.  Patients can then  the prescription on their way out of the hospital at discharge, or pharmacy can deliver to the bedside if staff is available. (payment due at time of pick-up or delivery - cash, check, or card accepted)     Able to afford home medications/ co-pay costs: Yes    ADLS:  Current PT AM-PAC Score:   /24  Current OT AM-PAC Score:   /24      DISCHARGE Disposition: [address] (LTC): [place]  Phone: [phone]  Fax: [phone]    LOC at discharge: [address] Completed: Yes    Notification completed in HENS/PAS?:  Not Applicable    IMM Completed:   Yes, Case management has presented and reviewed IMM letter #2 to the patient and/or family/ POA. Patient and/or family/POA verbalized understanding of their medicare rights and appeal process if needed. Patient and/or family/POA has signed and placed today's date (9/7) and time (1100) on letter #2 on the the appropriate lines. Patient and/or family/POA, provided copy of signed letter and they are aware that this original copy of IMM letter #2 is available prior to discharge from the paper chart on the unit. Electronic documentation has been entered into epic for IMM letter #2 and original paper copy has been added to the paper chart at the nurses station. (Patient refused)    Transportation:  Transportation PLAN for discharge: EMS transportation   Mode of Transport: Ambulance stretcher - BLS  Reason for medical transport: Bed confined: Meets the following criteria 1) unable to get out of bed without assistance or ambulate, 2) unable to safely sit up in a wheelchair, 3) unable to maintain erect seating position in a chair for time needed for transport  Name of Transport Company: [place]: [id number]  Time of Transport: 1815    Transport form completed: Yes      Additional CM Notes: patient to dc back to [place]. Attempted to call rex Sanchez, no answer.  Transport set for 1815      RN to call report to: [phone]        The Plan for Transition of Care is related to the following treatment goals of Hepatic encephalopathy ([address]) [K76.82]  Altered mental status [R41.82]  Urinary tract infection without hematuria, site unspecified [N39.0]  AMS (altered

## 2023-09-07 NOTE — PROGRESS NOTES
Discharge  Patient being discharged to [address] [place] [address]. SBAR report called to [facility]. All questions answered. PIV x1 removed. Patient transported via [employer] [facility].  Electronically signed by Zoë Trinidad RN on 9/7/2023 at 4:50 PM

## 2023-09-07 NOTE — PROGRESS NOTES
Sister, Tang Almonte called and provided with update. Would like to speak with SW re: pt transferring to facility in [place], closer to sister.  Electronically signed by Jeannine Guerra RN on 9/7/2023 at 10:34 AM

## 2023-09-08 LAB
BACTERIA FLD AEROBE CULT: NORMAL
GRAM STN SPEC: NORMAL
MITOCHONDRIA M2 AB SER IA-ACNC: 1.1 U/ML (ref 0–4)

## 2023-09-17 ENCOUNTER — APPOINTMENT (OUTPATIENT)
Dept: CT IMAGING | Age: 58
DRG: 441 | End: 2023-09-17
Payer: MEDICARE

## 2023-09-17 ENCOUNTER — APPOINTMENT (OUTPATIENT)
Dept: GENERAL RADIOLOGY | Age: 58
DRG: 441 | End: 2023-09-17
Payer: MEDICARE

## 2023-09-17 ENCOUNTER — HOSPITAL ENCOUNTER (INPATIENT)
Age: 58
LOS: 7 days | Discharge: NURSING, REHAB OR LONG-TERM CARE | DRG: 441 | End: 2023-09-25
Attending: EMERGENCY MEDICINE | Admitting: INTERNAL MEDICINE
Payer: MEDICARE

## 2023-09-17 DIAGNOSIS — R41.82 ALTERED MENTAL STATUS, UNSPECIFIED ALTERED MENTAL STATUS TYPE: Primary | ICD-10-CM

## 2023-09-17 DIAGNOSIS — N17.9 AKI (ACUTE KIDNEY INJURY) (HCC): ICD-10-CM

## 2023-09-17 DIAGNOSIS — K76.82 HEPATIC ENCEPHALOPATHY (HCC): ICD-10-CM

## 2023-09-17 LAB
ALBUMIN SERPL-MCNC: 2.3 G/DL (ref 3.4–5)
ALBUMIN/GLOB SERPL: 0.6 {RATIO} (ref 1.1–2.2)
ALP SERPL-CCNC: 113 U/L (ref 40–129)
ALT SERPL-CCNC: 45 U/L (ref 10–40)
AMMONIA PLAS-SCNC: 107 UMOL/L (ref 11–51)
AMPHETAMINES UR QL SCN>1000 NG/ML: ABNORMAL
ANION GAP SERPL CALCULATED.3IONS-SCNC: 12 MMOL/L (ref 3–16)
AST SERPL-CCNC: 90 U/L (ref 15–37)
BACTERIA URNS QL MICRO: ABNORMAL /HPF
BARBITURATES UR QL SCN>200 NG/ML: ABNORMAL
BASOPHILS # BLD: 0.1 K/UL (ref 0–0.2)
BASOPHILS NFR BLD: 0.7 %
BENZODIAZ UR QL SCN>200 NG/ML: POSITIVE
BILIRUB SERPL-MCNC: 2.9 MG/DL (ref 0–1)
BILIRUB UR QL STRIP.AUTO: ABNORMAL
BUN SERPL-MCNC: 29 MG/DL (ref 7–20)
CALCIUM SERPL-MCNC: 9 MG/DL (ref 8.3–10.6)
CANNABINOIDS UR QL SCN>50 NG/ML: ABNORMAL
CHLORIDE SERPL-SCNC: 97 MMOL/L (ref 99–110)
CLARITY UR: CLEAR
CO2 SERPL-SCNC: 21 MMOL/L (ref 21–32)
COCAINE UR QL SCN: ABNORMAL
COLOR UR: YELLOW
CREAT SERPL-MCNC: 1.9 MG/DL (ref 0.6–1.1)
DEPRECATED RDW RBC AUTO: 15.6 % (ref 12.4–15.4)
DRUG SCREEN COMMENT UR-IMP: ABNORMAL
EOSINOPHIL # BLD: 0.3 K/UL (ref 0–0.6)
EOSINOPHIL NFR BLD: 4.2 %
EPI CELLS #/AREA URNS HPF: ABNORMAL /HPF (ref 0–5)
FENTANYL SCREEN, URINE: ABNORMAL
FLUAV RNA RESP QL NAA+PROBE: NOT DETECTED
FLUBV RNA RESP QL NAA+PROBE: NOT DETECTED
GFR SERPLBLD CREATININE-BSD FMLA CKD-EPI: 30 ML/MIN/{1.73_M2}
GLUCOSE SERPL-MCNC: 102 MG/DL (ref 70–99)
GLUCOSE UR STRIP.AUTO-MCNC: NEGATIVE MG/DL
HCT VFR BLD AUTO: 31.6 % (ref 36–48)
HGB BLD-MCNC: 10.8 G/DL (ref 12–16)
HGB UR QL STRIP.AUTO: ABNORMAL
HYALINE CASTS #/AREA URNS LPF: ABNORMAL /LPF (ref 0–2)
KETONES UR STRIP.AUTO-MCNC: NEGATIVE MG/DL
LACTATE BLDV-SCNC: 1.4 MMOL/L (ref 0.4–2)
LEUKOCYTE ESTERASE UR QL STRIP.AUTO: NEGATIVE
LYMPHOCYTES # BLD: 2 K/UL (ref 1–5.1)
LYMPHOCYTES NFR BLD: 25.5 %
MCH RBC QN AUTO: 33.8 PG (ref 26–34)
MCHC RBC AUTO-ENTMCNC: 34 G/DL (ref 31–36)
MCV RBC AUTO: 99.5 FL (ref 80–100)
METHADONE UR QL SCN>300 NG/ML: ABNORMAL
MONOCYTES # BLD: 1.1 K/UL (ref 0–1.3)
MONOCYTES NFR BLD: 14.2 %
NEUTROPHILS # BLD: 4.3 K/UL (ref 1.7–7.7)
NEUTROPHILS NFR BLD: 55.4 %
NITRITE UR QL STRIP.AUTO: NEGATIVE
NT-PROBNP SERPL-MCNC: 478 PG/ML (ref 0–124)
OPIATES UR QL SCN>300 NG/ML: ABNORMAL
OXYCODONE UR QL SCN: ABNORMAL
PCP UR QL SCN>25 NG/ML: ABNORMAL
PH UR STRIP.AUTO: 6 [PH] (ref 5–8)
PH UR STRIP: 6 [PH]
PLATELET # BLD AUTO: 181 K/UL (ref 135–450)
PMV BLD AUTO: 7.2 FL (ref 5–10.5)
POTASSIUM SERPL-SCNC: 5 MMOL/L (ref 3.5–5.1)
PROT SERPL-MCNC: 6.3 G/DL (ref 6.4–8.2)
PROT UR STRIP.AUTO-MCNC: NEGATIVE MG/DL
RBC # BLD AUTO: 3.18 M/UL (ref 4–5.2)
RBC #/AREA URNS HPF: ABNORMAL /HPF (ref 0–4)
SARS-COV-2 RNA RESP QL NAA+PROBE: NOT DETECTED
SODIUM SERPL-SCNC: 130 MMOL/L (ref 136–145)
SP GR UR STRIP.AUTO: 1.01 (ref 1–1.03)
TROPONIN, HIGH SENSITIVITY: 43 NG/L (ref 0–14)
UA COMPLETE W REFLEX CULTURE PNL UR: YES
UA DIPSTICK W REFLEX MICRO PNL UR: YES
URN SPEC COLLECT METH UR: ABNORMAL
UROBILINOGEN UR STRIP-ACNC: 4 E.U./DL
WBC # BLD AUTO: 7.8 K/UL (ref 4–11)
WBC #/AREA URNS HPF: ABNORMAL /HPF (ref 0–5)

## 2023-09-17 PROCEDURE — 80307 DRUG TEST PRSMV CHEM ANLYZR: CPT

## 2023-09-17 PROCEDURE — 85025 COMPLETE CBC W/AUTO DIFF WBC: CPT

## 2023-09-17 PROCEDURE — 71045 X-RAY EXAM CHEST 1 VIEW: CPT

## 2023-09-17 PROCEDURE — 36415 COLL VENOUS BLD VENIPUNCTURE: CPT

## 2023-09-17 PROCEDURE — 99285 EMERGENCY DEPT VISIT HI MDM: CPT

## 2023-09-17 PROCEDURE — 87636 SARSCOV2 & INF A&B AMP PRB: CPT

## 2023-09-17 PROCEDURE — 93005 ELECTROCARDIOGRAM TRACING: CPT | Performed by: EMERGENCY MEDICINE

## 2023-09-17 PROCEDURE — 87040 BLOOD CULTURE FOR BACTERIA: CPT

## 2023-09-17 PROCEDURE — 84484 ASSAY OF TROPONIN QUANT: CPT

## 2023-09-17 PROCEDURE — 81001 URINALYSIS AUTO W/SCOPE: CPT

## 2023-09-17 PROCEDURE — 70450 CT HEAD/BRAIN W/O DYE: CPT

## 2023-09-17 PROCEDURE — 82140 ASSAY OF AMMONIA: CPT

## 2023-09-17 PROCEDURE — 83880 ASSAY OF NATRIURETIC PEPTIDE: CPT

## 2023-09-17 PROCEDURE — 83605 ASSAY OF LACTIC ACID: CPT

## 2023-09-17 PROCEDURE — 80053 COMPREHEN METABOLIC PANEL: CPT

## 2023-09-18 PROBLEM — G93.40 ACUTE ENCEPHALOPATHY: Status: ACTIVE | Noted: 2023-09-18

## 2023-09-18 LAB
APPEARANCE FLUID: CLEAR
BDY FLUID QUALITY: NORMAL
CELL COUNT FLUID TYPE: NORMAL
COLOR FLUID: YELLOW
EKG ATRIAL RATE: 99 BPM
EKG DIAGNOSIS: NORMAL
EKG P AXIS: 6 DEGREES
EKG P-R INTERVAL: 160 MS
EKG Q-T INTERVAL: 384 MS
EKG QRS DURATION: 94 MS
EKG QTC CALCULATION (BAZETT): 492 MS
EKG R AXIS: 47 DEGREES
EKG T AXIS: 113 DEGREES
EKG VENTRICULAR RATE: 99 BPM
LYMPHOCYTES NFR FLD: 58 %
MACROPHAGES # FLD: 6 %
MESOTHL CELL NFR FLD: 5 %
MONOCYTES NFR FLD: 8 %
NEUTROPHIL, FLUID: 22 %
NUC CELL # FLD: 207 /CUMM
RBC FLUID: <1000 /CUMM
TROPONIN, HIGH SENSITIVITY: 41 NG/L (ref 0–14)

## 2023-09-18 PROCEDURE — 6370000000 HC RX 637 (ALT 250 FOR IP): Performed by: INTERNAL MEDICINE

## 2023-09-18 PROCEDURE — 2580000003 HC RX 258: Performed by: INTERNAL MEDICINE

## 2023-09-18 PROCEDURE — 84484 ASSAY OF TROPONIN QUANT: CPT

## 2023-09-18 PROCEDURE — 87086 URINE CULTURE/COLONY COUNT: CPT

## 2023-09-18 PROCEDURE — 87205 SMEAR GRAM STAIN: CPT

## 2023-09-18 PROCEDURE — 2580000003 HC RX 258: Performed by: EMERGENCY MEDICINE

## 2023-09-18 PROCEDURE — 87070 CULTURE OTHR SPECIMN AEROBIC: CPT

## 2023-09-18 PROCEDURE — 1200000000 HC SEMI PRIVATE

## 2023-09-18 PROCEDURE — 89050 BODY FLUID CELL COUNT: CPT

## 2023-09-18 PROCEDURE — 6360000002 HC RX W HCPCS: Performed by: INTERNAL MEDICINE

## 2023-09-18 PROCEDURE — 36415 COLL VENOUS BLD VENIPUNCTURE: CPT

## 2023-09-18 RX ORDER — SODIUM CHLORIDE, SODIUM LACTATE, POTASSIUM CHLORIDE, AND CALCIUM CHLORIDE .6; .31; .03; .02 G/100ML; G/100ML; G/100ML; G/100ML
500 INJECTION, SOLUTION INTRAVENOUS ONCE
Status: COMPLETED | OUTPATIENT
Start: 2023-09-18 | End: 2023-09-18

## 2023-09-18 RX ORDER — ENOXAPARIN SODIUM 100 MG/ML
30 INJECTION SUBCUTANEOUS 2 TIMES DAILY
Status: DISCONTINUED | OUTPATIENT
Start: 2023-09-18 | End: 2023-09-25 | Stop reason: HOSPADM

## 2023-09-18 RX ORDER — SODIUM CHLORIDE 9 MG/ML
INJECTION, SOLUTION INTRAVENOUS PRN
Status: DISCONTINUED | OUTPATIENT
Start: 2023-09-18 | End: 2023-09-25 | Stop reason: HOSPADM

## 2023-09-18 RX ORDER — FUROSEMIDE 20 MG/1
40 TABLET ORAL DAILY
Status: ON HOLD | COMMUNITY
End: 2023-09-25 | Stop reason: HOSPADM

## 2023-09-18 RX ORDER — ACETAMINOPHEN 650 MG/1
650 SUPPOSITORY RECTAL EVERY 6 HOURS PRN
Status: DISCONTINUED | OUTPATIENT
Start: 2023-09-18 | End: 2023-09-25 | Stop reason: HOSPADM

## 2023-09-18 RX ORDER — POLYETHYLENE GLYCOL 3350 17 G/17G
17 POWDER, FOR SOLUTION ORAL DAILY PRN
Status: DISCONTINUED | OUTPATIENT
Start: 2023-09-18 | End: 2023-09-18

## 2023-09-18 RX ORDER — SODIUM CHLORIDE 0.9 % (FLUSH) 0.9 %
5-40 SYRINGE (ML) INJECTION PRN
Status: DISCONTINUED | OUTPATIENT
Start: 2023-09-18 | End: 2023-09-25 | Stop reason: HOSPADM

## 2023-09-18 RX ORDER — SODIUM CHLORIDE, SODIUM LACTATE, POTASSIUM CHLORIDE, CALCIUM CHLORIDE 600; 310; 30; 20 MG/100ML; MG/100ML; MG/100ML; MG/100ML
INJECTION, SOLUTION INTRAVENOUS CONTINUOUS
Status: ACTIVE | OUTPATIENT
Start: 2023-09-18 | End: 2023-09-18

## 2023-09-18 RX ORDER — ONDANSETRON 4 MG/1
4 TABLET, ORALLY DISINTEGRATING ORAL EVERY 8 HOURS PRN
Status: DISCONTINUED | OUTPATIENT
Start: 2023-09-18 | End: 2023-09-25 | Stop reason: HOSPADM

## 2023-09-18 RX ORDER — SODIUM CHLORIDE 0.9 % (FLUSH) 0.9 %
5-40 SYRINGE (ML) INJECTION EVERY 12 HOURS SCHEDULED
Status: DISCONTINUED | OUTPATIENT
Start: 2023-09-18 | End: 2023-09-25 | Stop reason: HOSPADM

## 2023-09-18 RX ORDER — LACTULOSE 10 G/15ML
20 SOLUTION ORAL 3 TIMES DAILY
Status: DISCONTINUED | OUTPATIENT
Start: 2023-09-18 | End: 2023-09-18

## 2023-09-18 RX ORDER — ONDANSETRON 2 MG/ML
4 INJECTION INTRAMUSCULAR; INTRAVENOUS EVERY 6 HOURS PRN
Status: DISCONTINUED | OUTPATIENT
Start: 2023-09-18 | End: 2023-09-25 | Stop reason: HOSPADM

## 2023-09-18 RX ORDER — ACETAMINOPHEN 325 MG/1
650 TABLET ORAL EVERY 6 HOURS PRN
Status: DISCONTINUED | OUTPATIENT
Start: 2023-09-18 | End: 2023-09-25 | Stop reason: HOSPADM

## 2023-09-18 RX ADMIN — LACTULOSE: 10 SOLUTION ORAL at 10:55

## 2023-09-18 RX ADMIN — ENOXAPARIN SODIUM 30 MG: 100 INJECTION SUBCUTANEOUS at 21:35

## 2023-09-18 RX ADMIN — SODIUM CHLORIDE, SODIUM LACTATE, POTASSIUM CHLORIDE, AND CALCIUM CHLORIDE 500 ML: .6; .31; .03; .02 INJECTION, SOLUTION INTRAVENOUS at 01:32

## 2023-09-18 RX ADMIN — ENOXAPARIN SODIUM 30 MG: 100 INJECTION SUBCUTANEOUS at 09:11

## 2023-09-18 RX ADMIN — LACTULOSE: 10 SOLUTION ORAL at 22:20

## 2023-09-18 RX ADMIN — SODIUM CHLORIDE, PRESERVATIVE FREE 10 ML: 5 INJECTION INTRAVENOUS at 21:36

## 2023-09-18 RX ADMIN — LACTULOSE: 10 SOLUTION ORAL at 16:06

## 2023-09-18 RX ADMIN — LACTULOSE SOLUTION USP, 10 G/15 ML: 10 SOLUTION ORAL; RECTAL at 05:07

## 2023-09-18 RX ADMIN — SODIUM CHLORIDE, SODIUM LACTATE, POTASSIUM CHLORIDE, AND CALCIUM CHLORIDE 500 ML: .6; .31; .03; .02 INJECTION, SOLUTION INTRAVENOUS at 01:37

## 2023-09-18 RX ADMIN — SODIUM CHLORIDE, POTASSIUM CHLORIDE, SODIUM LACTATE AND CALCIUM CHLORIDE: 600; 310; 30; 20 INJECTION, SOLUTION INTRAVENOUS at 05:04

## 2023-09-18 ASSESSMENT — PAIN SCALES - GENERAL
PAINLEVEL_OUTOF10: 0
PAINLEVEL_OUTOF10: 1
PAINLEVEL_OUTOF10: 0

## 2023-09-18 ASSESSMENT — PAIN SCALES - WONG BAKER: WONGBAKER_NUMERICALRESPONSE: 0

## 2023-09-18 NOTE — H&P
V2.0  History and Physical      Name:  Melony Wang /Age/Sex: 1965  (65 y.o. female)   MRN & CSN:  [id number] & [id number] Encounter Date/Time: 2023 1:59 AM EDT   Location:  [facility]/O62-54 PCP: Brandy Falk MD       Hospital Day: 2    Assessment and Plan:   Melony Wang is a 65 y.o. female with a pmh of decompensated liver cirrhosis who presents with Acute encephalopathy    Hospital Problems             Last Modified POA    * (Principal) Acute encephalopathy 2023 Yes   #Acute encephalopathy-likely secondary to hepatic encephalopathy  Elevated ammonia levels 107  CT head with no acute abnormalities  UDS-positive for benzodiazepines. On Xanax at nursing home medication list  UA-grossly contaminated    #ALONA    #Initially elevated high-sensitivity troponin. EKG as below, nonischemic. Repeat troponin pending    #Decompensated liver indicated with cirrhosis, ascites and portal hypertension, coagulopathy  Likely secondary to progression of LOYOLA  Status post diagnostic paracentesis, fluid studies with nucleated cells of 235  Patient has no abdominal tenderness on exam    #Severe obesity with BMI of 52    #Stage II pressure ulcer-left buttock and right posterior thigh POA    Plan:  Keep n.p.o. Lactulose enema for now  Bedside swallow evaluation we will start on p.o. lactulose and rifaximin  Pharmacy consult for verification of nursing home medications  Gentle IV hydration with LR  Follow urine cultures  Monitor ammonia levels, electrolytes and renal function  Follow fluid studies  Wound care  GI consult  Will check PT/INR  Repeat EKG in a.m. Follow high-sensitivity troponin  Supportive therapy      Disposition:   Current Living situation: SNF  Expected Disposition: SNF  Estimated D/C:  To be decided    Diet Diet NPO   DVT Prophylaxis [x] Lovenox, []  Heparin, [] SCDs, [] Ambulation,  [] Eliquis, [] Xarelto, [] Coumadin   Code Status Full Code   Surrogate Decision Maker/ POA Patient once mental status

## 2023-09-18 NOTE — CONSULTS
[address] [facility] [place]  GI Consultation                                                                 Patient: Mannie Sahu  : 1965       Date:  2023           History of Present Illness:  Patient is a 65 y.o.  female admitted with Hepatic encephalopathy ([address] [facility]) [K76.82]  ALONA (acute kidney injury) ([address] [facility]) [N17.9]  Acute encephalopathy [G93.40]  Altered mental status, unspecified altered mental status type [R41.82] who is seen in consult for altered mental status likely due to decompensation of LOYOLA cirrhosis. Pt is currently not oriented to person, setting, or time. She is A/O x2 at baseline. Past Medical History:   Diagnosis Date    Falls frequently     History of opioid abuse ([facility])     Hypomagnesemia     Hyponatremia     Hypopituitarism ([facility])     Hypothyroidism     Legal blindness     Sleep apnea     Venous insufficiency       Past Surgical History:   Procedure Laterality Date    GASTRIC BYPASS SURGERY          PAST ENDOSCOPIC HISTORY: no prior endoscopy. MEDICATION:  Admission Meds  No current facility-administered medications on file prior to encounter. Current Outpatient Medications on File Prior to Encounter   Medication Sig Dispense Refill    rifAXIMin (XIFAXAN) 550 MG tablet Take 1 tablet by mouth 2 times daily Indications: Impaired Brain Function due to Liver Disease      furosemide (LASIX) 20 MG tablet Take 2 tablets by mouth daily Indications: Edema      lactulose (CHRONULAC) 10 GM/15ML solution Take 45 mLs by mouth 3 times daily Titrate to 2-3 bowel movements a day. 90 each 1    busPIRone (BUSPAR) 5 MG tablet Take 1 tablet by mouth 2 times daily      DULoxetine (CYMBALTA) 60 MG extended release capsule Take 1 capsule by mouth daily      gabapentin (NEURONTIN) 400 MG capsule Take 1 capsule by mouth every evening.       lidocaine 4 % external patch Place 1 patch onto the skin daily Apply to left knee      oxyCODONE-acetaminophen (PERCOCET)  MG per tablet Take

## 2023-09-18 NOTE — ED NOTES
ED TO INPATIENT SBAR HANDOFF    Patient Name: Hilton Romo   :  1965  65 y.o. Preferred Name  Kiya Dial  Family/Caregiver Present no   Restraints no   C-SSRS:    Sitter no   Sepsis Risk Score Sepsis Risk Score: 0.46      Situation  Chief Complaint   Patient presents with    Altered Mental Status     Sent from nursing home, pt baseline is alert to self, not verbally responding. Brief Description of Patient's Condition: From SNF, baseline alert to self, not speaking at this time. Hx cirrhosis. Bedside paracentesis done, fluid sent to lab. Pt bed bound at baseline. IV left upper arm. Mental Status: disoriented and alert  Arrived from: nursing home    Imaging:   CT HEAD WO CONTRAST   Final Result   1. Normal noncontrast CT scan of the head. No evidence of intracranial hemorrhage, mass, or recent infarct. Electronically signed by Jenny Preciado MD      XR CHEST PORTABLE   Final Result      1. No acute disease.               Abnormal labs:   Abnormal Labs Reviewed   COMPREHENSIVE METABOLIC PANEL W/ REFLEX TO MG FOR LOW K - Abnormal; Notable for the following components:       Result Value    Sodium 130 (*)     Chloride 97 (*)     Glucose 102 (*)     BUN 29 (*)     Creatinine 1.9 (*)     Est, Glom Filt Rate 30 (*)     Total Protein 6.3 (*)     Albumin 2.3 (*)     Albumin/Globulin Ratio 0.6 (*)     Total Bilirubin 2.9 (*)     ALT 45 (*)     AST 90 (*)     All other components within normal limits   CBC WITH AUTO DIFFERENTIAL - Abnormal; Notable for the following components:    RBC 3.18 (*)     Hemoglobin 10.8 (*)     Hematocrit 31.6 (*)     RDW 15.6 (*)     All other components within normal limits   AMMONIA - Abnormal; Notable for the following components:    Ammonia 107 (*)     All other components within normal limits    Narrative:     Bard Roque tel. [id number],  Chemistry results called to and read back by JORGE MENDIOLA, 2023 23:47, by  [address] - Abnormal; Notable for the

## 2023-09-19 LAB
ANION GAP SERPL CALCULATED.3IONS-SCNC: 11 MMOL/L (ref 3–16)
BACTERIA UR CULT: NORMAL
BUN SERPL-MCNC: 30 MG/DL (ref 7–20)
CALCIUM SERPL-MCNC: 9.1 MG/DL (ref 8.3–10.6)
CHLORIDE SERPL-SCNC: 102 MMOL/L (ref 99–110)
CO2 SERPL-SCNC: 22 MMOL/L (ref 21–32)
CREAT SERPL-MCNC: 1.4 MG/DL (ref 0.6–1.1)
GFR SERPLBLD CREATININE-BSD FMLA CKD-EPI: 44 ML/MIN/{1.73_M2}
GLUCOSE SERPL-MCNC: 82 MG/DL (ref 70–99)
POTASSIUM SERPL-SCNC: 4.1 MMOL/L (ref 3.5–5.1)
SODIUM SERPL-SCNC: 135 MMOL/L (ref 136–145)

## 2023-09-19 PROCEDURE — 6370000000 HC RX 637 (ALT 250 FOR IP): Performed by: INTERNAL MEDICINE

## 2023-09-19 PROCEDURE — 80048 BASIC METABOLIC PNL TOTAL CA: CPT

## 2023-09-19 PROCEDURE — 36415 COLL VENOUS BLD VENIPUNCTURE: CPT

## 2023-09-19 PROCEDURE — 2580000003 HC RX 258: Performed by: INTERNAL MEDICINE

## 2023-09-19 PROCEDURE — 6360000002 HC RX W HCPCS: Performed by: INTERNAL MEDICINE

## 2023-09-19 PROCEDURE — 1200000000 HC SEMI PRIVATE

## 2023-09-19 RX ORDER — LEVOTHYROXINE SODIUM 0.07 MG/1
75 TABLET ORAL DAILY
Status: DISCONTINUED | OUTPATIENT
Start: 2023-09-19 | End: 2023-09-25 | Stop reason: HOSPADM

## 2023-09-19 RX ORDER — LIDOCAINE 4 G/G
1 PATCH TOPICAL DAILY
Status: DISCONTINUED | OUTPATIENT
Start: 2023-09-19 | End: 2023-09-25 | Stop reason: HOSPADM

## 2023-09-19 RX ORDER — LACTULOSE 10 G/15ML
30 SOLUTION ORAL 3 TIMES DAILY
Status: DISCONTINUED | OUTPATIENT
Start: 2023-09-19 | End: 2023-09-25 | Stop reason: HOSPADM

## 2023-09-19 RX ADMIN — SODIUM CHLORIDE, PRESERVATIVE FREE 10 ML: 5 INJECTION INTRAVENOUS at 07:59

## 2023-09-19 RX ADMIN — RIFAXIMIN 550 MG: 550 TABLET ORAL at 21:04

## 2023-09-19 RX ADMIN — ENOXAPARIN SODIUM 30 MG: 100 INJECTION SUBCUTANEOUS at 07:59

## 2023-09-19 RX ADMIN — LACTULOSE 30 G: 10 SOLUTION ORAL at 14:09

## 2023-09-19 RX ADMIN — ENOXAPARIN SODIUM 30 MG: 100 INJECTION SUBCUTANEOUS at 21:04

## 2023-09-19 RX ADMIN — LACTULOSE 30 G: 10 SOLUTION ORAL at 21:02

## 2023-09-19 RX ADMIN — LEVOTHYROXINE SODIUM 75 MCG: 0.07 TABLET ORAL at 15:58

## 2023-09-19 RX ADMIN — SODIUM CHLORIDE, PRESERVATIVE FREE 10 ML: 5 INJECTION INTRAVENOUS at 21:14

## 2023-09-19 ASSESSMENT — PAIN SCALES - WONG BAKER: WONGBAKER_NUMERICALRESPONSE: 0

## 2023-09-19 ASSESSMENT — PAIN SCALES - GENERAL: PAINLEVEL_OUTOF10: 0

## 2023-09-19 NOTE — PLAN OF CARE
Problem: Discharge Planning  Goal: Discharge to home or other facility with appropriate resources  9/19/2023 0105 by Jolene Gilbert RN  Outcome: Progressing  Flowsheets (Taken 9/19/2023 0105)  Discharge to home or other facility with appropriate resources:   Identify barriers to discharge with patient and caregiver       Problem: Pain  Goal: Verbalizes/displays adequate comfort level or baseline comfort level  9/19/2023 0105 by Jolene Gilbert RN  Outcome: Progressing  Flowsheets (Taken 9/19/2023 0105)  Verbalizes/displays adequate comfort level or baseline comfort level:   Encourage patient to monitor pain and request assistance   Assess pain using appropriate pain scale   Administer analgesics based on type and severity of pain and evaluate response   Implement non-pharmacological measures as appropriate and evaluate response   Consider cultural and social influences on pain and pain management       Problem: Safety - Adult  Goal: Free from fall injury  9/19/2023 0105 by Jolene Gilbert RN  Outcome: Progressing  Flowsheets (Taken 9/19/2023 0105)  Free From Fall Injury: Instruct family/caregiver on patient safety       Problem: Skin/Tissue Integrity  Goal: Absence of new skin breakdown  Description: 1. Monitor for areas of redness and/or skin breakdown  2. Assess vascular access sites hourly  3. Every 4-6 hours minimum:  Change oxygen saturation probe site  4. Every 4-6 hours:  If on nasal continuous positive airway pressure, respiratory therapy assess nares and determine need for appliance change or resting period.   9/19/2023 0105 by Jolene Gilbert RN  Outcome: Progressing

## 2023-09-20 LAB
AMORPH SED URNS QL MICRO: ABNORMAL /HPF
ANION GAP SERPL CALCULATED.3IONS-SCNC: 12 MMOL/L (ref 3–16)
BACTERIA URNS QL MICRO: ABNORMAL /HPF
BILIRUB UR QL STRIP.AUTO: ABNORMAL
BUN SERPL-MCNC: 31 MG/DL (ref 7–20)
CALCIUM SERPL-MCNC: 9.2 MG/DL (ref 8.3–10.6)
CHLORIDE SERPL-SCNC: 100 MMOL/L (ref 99–110)
CLARITY UR: CLEAR
CO2 SERPL-SCNC: 20 MMOL/L (ref 21–32)
COLOR UR: YELLOW
CREAT SERPL-MCNC: 1.3 MG/DL (ref 0.6–1.1)
CREAT UR-MCNC: 189.7 MG/DL (ref 28–259)
CRYSTALS URNS MICRO: ABNORMAL /HPF
EPI CELLS #/AREA URNS HPF: ABNORMAL /HPF (ref 0–5)
GFR SERPLBLD CREATININE-BSD FMLA CKD-EPI: 48 ML/MIN/{1.73_M2}
GLUCOSE SERPL-MCNC: 94 MG/DL (ref 70–99)
GLUCOSE UR STRIP.AUTO-MCNC: NEGATIVE MG/DL
HGB UR QL STRIP.AUTO: NEGATIVE
KETONES UR STRIP.AUTO-MCNC: NEGATIVE MG/DL
LEUKOCYTE ESTERASE UR QL STRIP.AUTO: NEGATIVE
NITRITE UR QL STRIP.AUTO: NEGATIVE
PH UR STRIP.AUTO: 5.5 [PH] (ref 5–8)
POTASSIUM SERPL-SCNC: 4.4 MMOL/L (ref 3.5–5.1)
PROT UR STRIP.AUTO-MCNC: NEGATIVE MG/DL
RBC #/AREA URNS HPF: ABNORMAL /HPF (ref 0–4)
SODIUM SERPL-SCNC: 132 MMOL/L (ref 136–145)
SODIUM UR-SCNC: <20 MMOL/L
SP GR UR STRIP.AUTO: 1.02 (ref 1–1.03)
UA DIPSTICK W REFLEX MICRO PNL UR: ABNORMAL
URN SPEC COLLECT METH UR: ABNORMAL
UROBILINOGEN UR STRIP-ACNC: 4 E.U./DL
WBC #/AREA URNS HPF: ABNORMAL /HPF (ref 0–5)

## 2023-09-20 PROCEDURE — 80048 BASIC METABOLIC PNL TOTAL CA: CPT

## 2023-09-20 PROCEDURE — 6360000002 HC RX W HCPCS: Performed by: INTERNAL MEDICINE

## 2023-09-20 PROCEDURE — 84300 ASSAY OF URINE SODIUM: CPT

## 2023-09-20 PROCEDURE — 82570 ASSAY OF URINE CREATININE: CPT

## 2023-09-20 PROCEDURE — 1200000000 HC SEMI PRIVATE

## 2023-09-20 PROCEDURE — 2580000003 HC RX 258: Performed by: INTERNAL MEDICINE

## 2023-09-20 PROCEDURE — 81001 URINALYSIS AUTO W/SCOPE: CPT

## 2023-09-20 PROCEDURE — 6370000000 HC RX 637 (ALT 250 FOR IP): Performed by: INTERNAL MEDICINE

## 2023-09-20 PROCEDURE — 36415 COLL VENOUS BLD VENIPUNCTURE: CPT

## 2023-09-20 RX ADMIN — RIFAXIMIN 550 MG: 550 TABLET ORAL at 08:25

## 2023-09-20 RX ADMIN — SODIUM CHLORIDE, PRESERVATIVE FREE 10 ML: 5 INJECTION INTRAVENOUS at 21:29

## 2023-09-20 RX ADMIN — ENOXAPARIN SODIUM 30 MG: 100 INJECTION SUBCUTANEOUS at 08:24

## 2023-09-20 RX ADMIN — RIFAXIMIN 550 MG: 550 TABLET ORAL at 21:19

## 2023-09-20 RX ADMIN — ENOXAPARIN SODIUM 30 MG: 100 INJECTION SUBCUTANEOUS at 21:19

## 2023-09-20 RX ADMIN — LACTULOSE 30 G: 10 SOLUTION ORAL at 12:55

## 2023-09-20 RX ADMIN — SODIUM CHLORIDE, PRESERVATIVE FREE 10 ML: 5 INJECTION INTRAVENOUS at 08:25

## 2023-09-20 RX ADMIN — LEVOTHYROXINE SODIUM 75 MCG: 0.07 TABLET ORAL at 08:24

## 2023-09-20 RX ADMIN — LACTULOSE 30 G: 10 SOLUTION ORAL at 21:19

## 2023-09-20 RX ADMIN — LACTULOSE 30 G: 10 SOLUTION ORAL at 08:24

## 2023-09-20 NOTE — CONSULTS
Interval History and plan:      Patient was seen at bedside with no complaints AAOx1 able to converse slowly but no acute distress or in any pain. PE was grossly unremarkable from her baseline. She was eating and able to converse. Lab work showing Cr improving to 1.3 from 1.9 on admission with baseline 0.5 to 0.7. Paracentesis showed < 207 nucleated cells and does not look infectious. Assessment :   ALONA 2/2 possible hepatorenal type 2 vs Prerenal   -S/p 1L LR bolus and maintenance fluid of 900 ml   -Cr improving towards baseline  -Oral hydration   -Avoid nephrotoxins  -Monitor Cr daily     Liver cirrhosis   -Agree with lactulose 3 times daily        [facility] would like to thank Cleve Guevara MD   for opportunity to serve this patient      Please call with questions at-   24 Hrs Answering service [phone] or  7 am- 5 pm via Perfect serve or cell phone       HPI :     Annette Gallegos is a 65 y.o. female with a pmh of decompensated liver cirrhosis presented to   the hospital on 9/17/2023 with acute encephalopathy and alona. Patient was altered when found at the SNF. Reportedly patient is normally conversant at baseline. Patient was placed on O2 which did not help despite her not being hypoxic. Patient lab work up here showed ammonia level ? 100 and CT head no acute changes. UDS had + for benzos but she takes xanax for sleep. Patient had a Cr 1.9 above her baseline of 0.5-07. Patient had a paracentesis done while inpatient. She denied any NSAID use or vomiting/diarrhea.        PMH/PSH/SH/Family History:     Past Medical History:   Diagnosis Date    Falls frequently     History of opioid abuse ([address] [facility])     Hypomagnesemia     Hyponatremia     Hypopituitarism ([address])     Hypothyroidism     Legal blindness     Sleep apnea     Venous insufficiency           Medication:   Current Facility-Administered Medications: lactulose (CHRONULAC) 10 GM/15ML solution 30 g, 30 g, Oral, TID  levothyroxine

## 2023-09-20 NOTE — CONSULTS
Consult received. Labs and notes were reviewed. Case was discussed with the staff. Full note to follow.     Thanks  Nephrology  [address] [facility],[address] # [id number] [address]  Office: [phone]  Cell: [phone]  Fax: [phone]

## 2023-09-21 LAB
ALBUMIN SERPL-MCNC: 2.3 G/DL (ref 3.4–5)
ALP SERPL-CCNC: 104 U/L (ref 40–129)
ALT SERPL-CCNC: 44 U/L (ref 10–40)
ANION GAP SERPL CALCULATED.3IONS-SCNC: 10 MMOL/L (ref 3–16)
AST SERPL-CCNC: 85 U/L (ref 15–37)
BACTERIA BLD CULT: NORMAL
BACTERIA FLD AEROBE CULT: NORMAL
BILIRUB DIRECT SERPL-MCNC: 2.1 MG/DL (ref 0–0.3)
BILIRUB INDIRECT SERPL-MCNC: 2.6 MG/DL (ref 0–1)
BILIRUB SERPL-MCNC: 4.7 MG/DL (ref 0–1)
BUN SERPL-MCNC: 29 MG/DL (ref 7–20)
CALCIUM SERPL-MCNC: 9.4 MG/DL (ref 8.3–10.6)
CHLORIDE SERPL-SCNC: 100 MMOL/L (ref 99–110)
CO2 SERPL-SCNC: 23 MMOL/L (ref 21–32)
CREAT SERPL-MCNC: 1.2 MG/DL (ref 0.6–1.1)
GFR SERPLBLD CREATININE-BSD FMLA CKD-EPI: 53 ML/MIN/{1.73_M2}
GLUCOSE SERPL-MCNC: 94 MG/DL (ref 70–99)
GRAM STN SPEC: NORMAL
POTASSIUM SERPL-SCNC: 4.3 MMOL/L (ref 3.5–5.1)
PROT SERPL-MCNC: 6.3 G/DL (ref 6.4–8.2)
SODIUM SERPL-SCNC: 133 MMOL/L (ref 136–145)

## 2023-09-21 PROCEDURE — 6370000000 HC RX 637 (ALT 250 FOR IP): Performed by: INTERNAL MEDICINE

## 2023-09-21 PROCEDURE — P9047 ALBUMIN (HUMAN), 25%, 50ML: HCPCS | Performed by: INTERNAL MEDICINE

## 2023-09-21 PROCEDURE — 6360000002 HC RX W HCPCS: Performed by: INTERNAL MEDICINE

## 2023-09-21 PROCEDURE — 80076 HEPATIC FUNCTION PANEL: CPT

## 2023-09-21 PROCEDURE — 36415 COLL VENOUS BLD VENIPUNCTURE: CPT

## 2023-09-21 PROCEDURE — 1200000000 HC SEMI PRIVATE

## 2023-09-21 PROCEDURE — 80048 BASIC METABOLIC PNL TOTAL CA: CPT

## 2023-09-21 PROCEDURE — 2580000003 HC RX 258: Performed by: INTERNAL MEDICINE

## 2023-09-21 RX ORDER — ALBUMIN (HUMAN) 12.5 G/50ML
50 SOLUTION INTRAVENOUS ONCE
Status: COMPLETED | OUTPATIENT
Start: 2023-09-21 | End: 2023-09-21

## 2023-09-21 RX ORDER — MIDODRINE HYDROCHLORIDE 5 MG/1
5 TABLET ORAL
Status: DISCONTINUED | OUTPATIENT
Start: 2023-09-21 | End: 2023-09-25

## 2023-09-21 RX ADMIN — LACTULOSE 30 G: 10 SOLUTION ORAL at 08:56

## 2023-09-21 RX ADMIN — SODIUM CHLORIDE, PRESERVATIVE FREE 10 ML: 5 INJECTION INTRAVENOUS at 08:57

## 2023-09-21 RX ADMIN — MIDODRINE HYDROCHLORIDE 5 MG: 5 TABLET ORAL at 12:06

## 2023-09-21 RX ADMIN — LACTULOSE 30 G: 10 SOLUTION ORAL at 21:09

## 2023-09-21 RX ADMIN — LACTULOSE 30 G: 10 SOLUTION ORAL at 13:57

## 2023-09-21 RX ADMIN — ALBUMIN (HUMAN) 50 G: 0.25 INJECTION, SOLUTION INTRAVENOUS at 09:53

## 2023-09-21 RX ADMIN — MIDODRINE HYDROCHLORIDE 5 MG: 5 TABLET ORAL at 18:35

## 2023-09-21 RX ADMIN — SODIUM CHLORIDE, PRESERVATIVE FREE 10 ML: 5 INJECTION INTRAVENOUS at 21:15

## 2023-09-21 RX ADMIN — LEVOTHYROXINE SODIUM 75 MCG: 0.07 TABLET ORAL at 08:55

## 2023-09-21 RX ADMIN — RIFAXIMIN 550 MG: 550 TABLET ORAL at 09:42

## 2023-09-21 RX ADMIN — RIFAXIMIN 550 MG: 550 TABLET ORAL at 21:08

## 2023-09-21 RX ADMIN — ENOXAPARIN SODIUM 30 MG: 100 INJECTION SUBCUTANEOUS at 21:08

## 2023-09-21 RX ADMIN — ENOXAPARIN SODIUM 30 MG: 100 INJECTION SUBCUTANEOUS at 08:57

## 2023-09-21 ASSESSMENT — PAIN SCALES - WONG BAKER
WONGBAKER_NUMERICALRESPONSE: 0

## 2023-09-21 ASSESSMENT — PAIN SCALES - GENERAL
PAINLEVEL_OUTOF10: 0
PAINLEVEL_OUTOF10: 0

## 2023-09-22 LAB
ALBUMIN SERPL-MCNC: 2.8 G/DL (ref 3.4–5)
ALP SERPL-CCNC: 103 U/L (ref 40–129)
ALT SERPL-CCNC: 45 U/L (ref 10–40)
AMMONIA PLAS-SCNC: 57 UMOL/L (ref 11–51)
ANION GAP SERPL CALCULATED.3IONS-SCNC: 14 MMOL/L (ref 3–16)
AST SERPL-CCNC: 84 U/L (ref 15–37)
BILIRUB DIRECT SERPL-MCNC: 2 MG/DL (ref 0–0.3)
BILIRUB INDIRECT SERPL-MCNC: 2.7 MG/DL (ref 0–1)
BILIRUB SERPL-MCNC: 4.7 MG/DL (ref 0–1)
BUN SERPL-MCNC: 26 MG/DL (ref 7–20)
CALCIUM SERPL-MCNC: 9.5 MG/DL (ref 8.3–10.6)
CHLORIDE SERPL-SCNC: 98 MMOL/L (ref 99–110)
CO2 SERPL-SCNC: 21 MMOL/L (ref 21–32)
CREAT SERPL-MCNC: 1 MG/DL (ref 0.6–1.1)
GFR SERPLBLD CREATININE-BSD FMLA CKD-EPI: >60 ML/MIN/{1.73_M2}
GLUCOSE SERPL-MCNC: 85 MG/DL (ref 70–99)
POTASSIUM SERPL-SCNC: 4.2 MMOL/L (ref 3.5–5.1)
PROT SERPL-MCNC: 6.8 G/DL (ref 6.4–8.2)
SODIUM SERPL-SCNC: 133 MMOL/L (ref 136–145)

## 2023-09-22 PROCEDURE — 36415 COLL VENOUS BLD VENIPUNCTURE: CPT

## 2023-09-22 PROCEDURE — 82140 ASSAY OF AMMONIA: CPT

## 2023-09-22 PROCEDURE — 6360000002 HC RX W HCPCS: Performed by: INTERNAL MEDICINE

## 2023-09-22 PROCEDURE — 80076 HEPATIC FUNCTION PANEL: CPT

## 2023-09-22 PROCEDURE — 6370000000 HC RX 637 (ALT 250 FOR IP): Performed by: INTERNAL MEDICINE

## 2023-09-22 PROCEDURE — 1200000000 HC SEMI PRIVATE

## 2023-09-22 PROCEDURE — 2580000003 HC RX 258: Performed by: INTERNAL MEDICINE

## 2023-09-22 PROCEDURE — 80048 BASIC METABOLIC PNL TOTAL CA: CPT

## 2023-09-22 RX ADMIN — LACTULOSE 30 G: 10 SOLUTION ORAL at 21:37

## 2023-09-22 RX ADMIN — RIFAXIMIN 550 MG: 550 TABLET ORAL at 09:18

## 2023-09-22 RX ADMIN — LACTULOSE 30 G: 10 SOLUTION ORAL at 09:49

## 2023-09-22 RX ADMIN — LACTULOSE 30 G: 10 SOLUTION ORAL at 13:48

## 2023-09-22 RX ADMIN — LEVOTHYROXINE SODIUM 75 MCG: 0.07 TABLET ORAL at 06:05

## 2023-09-22 RX ADMIN — SODIUM CHLORIDE, PRESERVATIVE FREE 10 ML: 5 INJECTION INTRAVENOUS at 09:07

## 2023-09-22 RX ADMIN — ENOXAPARIN SODIUM 30 MG: 100 INJECTION SUBCUTANEOUS at 21:39

## 2023-09-22 RX ADMIN — ENOXAPARIN SODIUM 30 MG: 100 INJECTION SUBCUTANEOUS at 09:49

## 2023-09-22 RX ADMIN — RIFAXIMIN 550 MG: 550 TABLET ORAL at 21:38

## 2023-09-22 RX ADMIN — SODIUM CHLORIDE, PRESERVATIVE FREE 10 ML: 5 INJECTION INTRAVENOUS at 21:42

## 2023-09-22 RX ADMIN — MIDODRINE HYDROCHLORIDE 5 MG: 5 TABLET ORAL at 11:24

## 2023-09-22 RX ADMIN — MIDODRINE HYDROCHLORIDE 5 MG: 5 TABLET ORAL at 09:49

## 2023-09-22 RX ADMIN — MIDODRINE HYDROCHLORIDE 5 MG: 5 TABLET ORAL at 17:22

## 2023-09-22 ASSESSMENT — PAIN SCALES - WONG BAKER
WONGBAKER_NUMERICALRESPONSE: 0
WONGBAKER_NUMERICALRESPONSE: 0

## 2023-09-22 ASSESSMENT — PAIN SCALES - GENERAL
PAINLEVEL_OUTOF10: 0
PAINLEVEL_OUTOF10: 0

## 2023-09-22 NOTE — PLAN OF CARE
Problem: Discharge Planning  Goal: Discharge to home or other facility with appropriate resources  Outcome: Progressing  Flowsheets (Taken 9/22/2023 0508)  Discharge to home or other facility with appropriate resources:   Refer to discharge planning if patient needs post-hospital services based on physician order or complex needs related to functional status, cognitive ability or social support system   Identify discharge learning needs (meds, wound care, etc)   Identify barriers to discharge with patient and caregiver     Problem: Pain  Goal: Verbalizes/displays adequate comfort level or baseline comfort level  Outcome: Progressing  Flowsheets (Taken 9/22/2023 0508)  Verbalizes/displays adequate comfort level or baseline comfort level:   Consider cultural and social influences on pain and pain management   Assess pain using appropriate pain scale   Implement non-pharmacological measures as appropriate and evaluate response   Encourage patient to monitor pain and request assistance     Problem: Safety - Adult  Goal: Free from fall injury  Outcome: Progressing  Flowsheets (Taken 9/22/2023 0508)  Free From Fall Injury: Instruct family/caregiver on patient safety     Problem: Skin/Tissue Integrity  Goal: Absence of new skin breakdown  Description: 1. Monitor for areas of redness and/or skin breakdown  2. Assess vascular access sites hourly  3. Every 4-6 hours minimum:  Change oxygen saturation probe site  4. Every 4-6 hours:  If on nasal continuous positive airway pressure, respiratory therapy assess nares and determine need for appliance change or resting period.   Outcome: Progressing     Problem: Nutrition Deficit:  Goal: Optimize nutritional status  Outcome: Progressing  Flowsheets (Taken 9/22/2023 0508)  Nutrient intake appropriate for improving, restoring, or maintaining nutritional needs:   Monitor oral intake, labs, and treatment plans   Assess nutritional status and recommend course of action

## 2023-09-23 LAB
ALBUMIN SERPL-MCNC: 2.6 G/DL (ref 3.4–5)
ALP SERPL-CCNC: 105 U/L (ref 40–129)
ALT SERPL-CCNC: 43 U/L (ref 10–40)
AST SERPL-CCNC: 91 U/L (ref 15–37)
BILIRUB DIRECT SERPL-MCNC: 1.8 MG/DL (ref 0–0.3)
BILIRUB INDIRECT SERPL-MCNC: 2 MG/DL (ref 0–1)
BILIRUB SERPL-MCNC: 3.8 MG/DL (ref 0–1)
PROT SERPL-MCNC: 6.5 G/DL (ref 6.4–8.2)

## 2023-09-23 PROCEDURE — 6370000000 HC RX 637 (ALT 250 FOR IP): Performed by: INTERNAL MEDICINE

## 2023-09-23 PROCEDURE — 2580000003 HC RX 258: Performed by: INTERNAL MEDICINE

## 2023-09-23 PROCEDURE — 36415 COLL VENOUS BLD VENIPUNCTURE: CPT

## 2023-09-23 PROCEDURE — 1200000000 HC SEMI PRIVATE

## 2023-09-23 PROCEDURE — 80076 HEPATIC FUNCTION PANEL: CPT

## 2023-09-23 PROCEDURE — 6360000002 HC RX W HCPCS: Performed by: INTERNAL MEDICINE

## 2023-09-23 RX ADMIN — LACTULOSE 30 G: 10 SOLUTION ORAL at 12:59

## 2023-09-23 RX ADMIN — LACTULOSE 30 G: 10 SOLUTION ORAL at 20:58

## 2023-09-23 RX ADMIN — MIDODRINE HYDROCHLORIDE 5 MG: 5 TABLET ORAL at 12:59

## 2023-09-23 RX ADMIN — MIDODRINE HYDROCHLORIDE 5 MG: 5 TABLET ORAL at 18:16

## 2023-09-23 RX ADMIN — RIFAXIMIN 550 MG: 550 TABLET ORAL at 20:59

## 2023-09-23 RX ADMIN — LEVOTHYROXINE SODIUM 75 MCG: 0.07 TABLET ORAL at 05:17

## 2023-09-23 RX ADMIN — RIFAXIMIN 550 MG: 550 TABLET ORAL at 09:52

## 2023-09-23 RX ADMIN — ENOXAPARIN SODIUM 30 MG: 100 INJECTION SUBCUTANEOUS at 09:53

## 2023-09-23 RX ADMIN — SODIUM CHLORIDE, PRESERVATIVE FREE 10 ML: 5 INJECTION INTRAVENOUS at 09:56

## 2023-09-23 RX ADMIN — LACTULOSE 30 G: 10 SOLUTION ORAL at 09:53

## 2023-09-23 RX ADMIN — MIDODRINE HYDROCHLORIDE 5 MG: 5 TABLET ORAL at 09:52

## 2023-09-23 RX ADMIN — ACETAMINOPHEN 650 MG: 325 TABLET ORAL at 18:16

## 2023-09-23 RX ADMIN — ENOXAPARIN SODIUM 30 MG: 100 INJECTION SUBCUTANEOUS at 20:58

## 2023-09-23 RX ADMIN — ONDANSETRON 4 MG: 2 INJECTION INTRAMUSCULAR; INTRAVENOUS at 10:05

## 2023-09-23 ASSESSMENT — PAIN DESCRIPTION - LOCATION: LOCATION: ABDOMEN

## 2023-09-23 ASSESSMENT — PAIN SCALES - WONG BAKER: WONGBAKER_NUMERICALRESPONSE: 0

## 2023-09-23 ASSESSMENT — PAIN DESCRIPTION - ORIENTATION: ORIENTATION: MID;LOWER

## 2023-09-23 ASSESSMENT — PAIN SCALES - GENERAL: PAINLEVEL_OUTOF10: 6

## 2023-09-23 ASSESSMENT — PAIN DESCRIPTION - DESCRIPTORS: DESCRIPTORS: ACHING

## 2023-09-23 NOTE — PLAN OF CARE
Problem: Safety - Adult  Goal: Free from fall injury  Flowsheets (Taken 9/22/2023 2122)  Free From Fall Injury: Instruct family/caregiver on patient safety     Problem: Skin/Tissue Integrity  Goal: Absence of new skin breakdown  Description: 1. Monitor for areas of redness and/or skin breakdown  2. Assess vascular access sites hourly  3. Every 4-6 hours minimum:  Change oxygen saturation probe site  4. Every 4-6 hours:  If on nasal continuous positive airway pressure, respiratory therapy assess nares and determine need for appliance change or resting period. Note: Patient has no new skin breaks.       Problem: Nutrition Deficit:  Goal: Optimize nutritional status  Flowsheets (Taken 9/22/2023 2123)  Nutrient intake appropriate for improving, restoring, or maintaining nutritional needs: Recommend appropriate diets, oral nutritional supplements, and vitamin/mineral supplements

## 2023-09-23 NOTE — PLAN OF CARE
Problem: Neurosensory - Adult  Goal: Achieves stable or improved neurological status  Outcome: Adequate for Discharge  Flowsheets (Taken 9/23/2023 1633)  Achieves stable or improved neurological status: Assess for and report changes in neurological status     Problem: Neurosensory - Adult  Goal: Achieves maximal functionality and self care  Outcome: Adequate for Discharge  Flowsheets (Taken 9/23/2023 1633)  Achieves maximal functionality and self care:   Encourage and assist patient to increase activity and self care with guidance from physical therapy/occupational therapy   Encourage visually impaired, hearing impaired and aphasic patients to use assistive/communication devices

## 2023-09-24 LAB
ALBUMIN SERPL-MCNC: 2.6 G/DL (ref 3.4–5)
ANION GAP SERPL CALCULATED.3IONS-SCNC: 11 MMOL/L (ref 3–16)
BASE EXCESS BLDA CALC-SCNC: -0.5 MMOL/L (ref -3–3)
BUN SERPL-MCNC: 23 MG/DL (ref 7–20)
CALCIUM SERPL-MCNC: 9.4 MG/DL (ref 8.3–10.6)
CHLORIDE SERPL-SCNC: 99 MMOL/L (ref 99–110)
CO2 BLDA-SCNC: 25 MMOL/L
CO2 SERPL-SCNC: 23 MMOL/L (ref 21–32)
COHGB MFR BLDA: 1.3 % (ref 0–1.5)
CREAT SERPL-MCNC: 1.2 MG/DL (ref 0.6–1.1)
GFR SERPLBLD CREATININE-BSD FMLA CKD-EPI: 53 ML/MIN/{1.73_M2}
GLUCOSE SERPL-MCNC: 114 MG/DL (ref 70–99)
HCO3 BLDA-SCNC: 24 MMOL/L (ref 21–29)
HGB BLDA-MCNC: 10 G/DL
METHGB MFR BLDA: 0.3 % (ref 0–1.4)
PCO2 BLDA: 35.4 MMHG (ref 35–45)
PH BLDA: 7.43 [PH] (ref 7.35–7.45)
PHOSPHATE SERPL-MCNC: 2.7 MG/DL (ref 2.5–4.9)
PO2 BLDA: 78.8 MMHG (ref 75–108)
POTASSIUM SERPL-SCNC: 3.6 MMOL/L (ref 3.5–5.1)
SAO2 % BLDA: 96 % (ref 93–100)
SODIUM SERPL-SCNC: 133 MMOL/L (ref 136–145)

## 2023-09-24 PROCEDURE — 2580000003 HC RX 258: Performed by: INTERNAL MEDICINE

## 2023-09-24 PROCEDURE — 80069 RENAL FUNCTION PANEL: CPT

## 2023-09-24 PROCEDURE — 6370000000 HC RX 637 (ALT 250 FOR IP): Performed by: INTERNAL MEDICINE

## 2023-09-24 PROCEDURE — 94761 N-INVAS EAR/PLS OXIMETRY MLT: CPT

## 2023-09-24 PROCEDURE — 82803 BLOOD GASES ANY COMBINATION: CPT

## 2023-09-24 PROCEDURE — 1200000000 HC SEMI PRIVATE

## 2023-09-24 PROCEDURE — 6360000002 HC RX W HCPCS: Performed by: INTERNAL MEDICINE

## 2023-09-24 PROCEDURE — 36415 COLL VENOUS BLD VENIPUNCTURE: CPT

## 2023-09-24 PROCEDURE — 36600 WITHDRAWAL OF ARTERIAL BLOOD: CPT

## 2023-09-24 RX ADMIN — SODIUM CHLORIDE, PRESERVATIVE FREE 10 ML: 5 INJECTION INTRAVENOUS at 21:06

## 2023-09-24 RX ADMIN — ONDANSETRON 4 MG: 2 INJECTION INTRAMUSCULAR; INTRAVENOUS at 08:26

## 2023-09-24 RX ADMIN — ENOXAPARIN SODIUM 30 MG: 100 INJECTION SUBCUTANEOUS at 08:26

## 2023-09-24 RX ADMIN — RIFAXIMIN 550 MG: 550 TABLET ORAL at 08:26

## 2023-09-24 RX ADMIN — LACTULOSE 30 G: 10 SOLUTION ORAL at 21:01

## 2023-09-24 RX ADMIN — MIDODRINE HYDROCHLORIDE 5 MG: 5 TABLET ORAL at 11:33

## 2023-09-24 RX ADMIN — MIDODRINE HYDROCHLORIDE 5 MG: 5 TABLET ORAL at 17:48

## 2023-09-24 RX ADMIN — RIFAXIMIN 550 MG: 550 TABLET ORAL at 21:01

## 2023-09-24 RX ADMIN — LACTULOSE 30 G: 10 SOLUTION ORAL at 14:21

## 2023-09-24 RX ADMIN — ENOXAPARIN SODIUM 30 MG: 100 INJECTION SUBCUTANEOUS at 21:01

## 2023-09-24 RX ADMIN — MIDODRINE HYDROCHLORIDE 5 MG: 5 TABLET ORAL at 08:26

## 2023-09-24 RX ADMIN — WATER: 100 IRRIGANT IRRIGATION at 11:55

## 2023-09-24 RX ADMIN — SODIUM CHLORIDE, PRESERVATIVE FREE 10 ML: 5 INJECTION INTRAVENOUS at 08:27

## 2023-09-24 RX ADMIN — LACTULOSE 30 G: 10 SOLUTION ORAL at 08:26

## 2023-09-24 RX ADMIN — LEVOTHYROXINE SODIUM 75 MCG: 0.07 TABLET ORAL at 05:42

## 2023-09-24 NOTE — PLAN OF CARE
Problem: Nutrition Deficit:  Goal: Optimize nutritional status  Outcome: Progressing  Flowsheets (Taken 9/24/2023 1802)  Nutrient intake appropriate for improving, restoring, or maintaining nutritional needs:   Assess nutritional status and recommend course of action   Monitor oral intake, labs, and treatment plans     Problem: Neurosensory - Adult  Goal: Achieves stable or improved neurological status  Outcome: Progressing  Flowsheets (Taken 9/24/2023 1802)  Achieves stable or improved neurological status:   Assess for and report changes in neurological status   Monitor temperature, glucose, and sodium.  Initiate appropriate interventions as ordered     Problem: Neurosensory - Adult  Goal: Achieves maximal functionality and self care  Flowsheets (Taken 9/24/2023 1802)  Achieves maximal functionality and self care:   Monitor swallowing and airway patency with patient fatigue and changes in neurological status   Encourage visually impaired, hearing impaired and aphasic patients to use assistive/communication devices     Problem: Skin/Tissue Integrity - Adult  Goal: Skin integrity remains intact  Flowsheets (Taken 9/24/2023 1802)  Skin Integrity Remains Intact:   Monitor for areas of redness and/or skin breakdown   Assess vascular access sites hourly

## 2023-09-25 VITALS
BODY MASS INDEX: 50.02 KG/M2 | WEIGHT: 293 LBS | SYSTOLIC BLOOD PRESSURE: 122 MMHG | DIASTOLIC BLOOD PRESSURE: 77 MMHG | HEART RATE: 92 BPM | TEMPERATURE: 97.7 F | HEIGHT: 64 IN | OXYGEN SATURATION: 99 % | RESPIRATION RATE: 17 BRPM

## 2023-09-25 LAB
ALBUMIN SERPL-MCNC: 3.1 G/DL (ref 3.4–5)
AMMONIA PLAS-SCNC: 66 UMOL/L (ref 11–51)
ANION GAP SERPL CALCULATED.3IONS-SCNC: 17 MMOL/L (ref 3–16)
BUN SERPL-MCNC: 23 MG/DL (ref 7–20)
CALCIUM SERPL-MCNC: 9.4 MG/DL (ref 8.3–10.6)
CHLORIDE SERPL-SCNC: 98 MMOL/L (ref 99–110)
CO2 SERPL-SCNC: 19 MMOL/L (ref 21–32)
CREAT SERPL-MCNC: 1.1 MG/DL (ref 0.6–1.1)
GFR SERPLBLD CREATININE-BSD FMLA CKD-EPI: 59 ML/MIN/{1.73_M2}
GLUCOSE SERPL-MCNC: 106 MG/DL (ref 70–99)
PHOSPHATE SERPL-MCNC: 2.7 MG/DL (ref 2.5–4.9)
POTASSIUM SERPL-SCNC: 4.4 MMOL/L (ref 3.5–5.1)
SODIUM SERPL-SCNC: 134 MMOL/L (ref 136–145)

## 2023-09-25 PROCEDURE — 82140 ASSAY OF AMMONIA: CPT

## 2023-09-25 PROCEDURE — 6360000002 HC RX W HCPCS: Performed by: INTERNAL MEDICINE

## 2023-09-25 PROCEDURE — 6370000000 HC RX 637 (ALT 250 FOR IP): Performed by: INTERNAL MEDICINE

## 2023-09-25 PROCEDURE — 36415 COLL VENOUS BLD VENIPUNCTURE: CPT

## 2023-09-25 PROCEDURE — P9047 ALBUMIN (HUMAN), 25%, 50ML: HCPCS | Performed by: INTERNAL MEDICINE

## 2023-09-25 PROCEDURE — 80069 RENAL FUNCTION PANEL: CPT

## 2023-09-25 PROCEDURE — 2580000003 HC RX 258: Performed by: INTERNAL MEDICINE

## 2023-09-25 RX ORDER — MIDODRINE HYDROCHLORIDE 5 MG/1
10 TABLET ORAL
Status: DISCONTINUED | OUTPATIENT
Start: 2023-09-25 | End: 2023-09-25 | Stop reason: HOSPADM

## 2023-09-25 RX ORDER — MIDODRINE HYDROCHLORIDE 10 MG/1
10 TABLET ORAL
Qty: 90 TABLET | Refills: 3 | Status: SHIPPED | OUTPATIENT
Start: 2023-09-25

## 2023-09-25 RX ORDER — ALBUMIN (HUMAN) 12.5 G/50ML
50 SOLUTION INTRAVENOUS ONCE
Status: COMPLETED | OUTPATIENT
Start: 2023-09-25 | End: 2023-09-25

## 2023-09-25 RX ADMIN — LEVOTHYROXINE SODIUM 75 MCG: 0.07 TABLET ORAL at 06:08

## 2023-09-25 RX ADMIN — SODIUM CHLORIDE, PRESERVATIVE FREE 10 ML: 5 INJECTION INTRAVENOUS at 09:37

## 2023-09-25 RX ADMIN — RIFAXIMIN 550 MG: 550 TABLET ORAL at 09:35

## 2023-09-25 RX ADMIN — LACTULOSE 30 G: 10 SOLUTION ORAL at 09:34

## 2023-09-25 RX ADMIN — ENOXAPARIN SODIUM 30 MG: 100 INJECTION SUBCUTANEOUS at 09:35

## 2023-09-25 RX ADMIN — MIDODRINE HYDROCHLORIDE 10 MG: 5 TABLET ORAL at 13:00

## 2023-09-25 RX ADMIN — ALBUMIN (HUMAN) 50 G: 0.25 INJECTION, SOLUTION INTRAVENOUS at 09:44

## 2023-09-25 RX ADMIN — MIDODRINE HYDROCHLORIDE 10 MG: 5 TABLET ORAL at 18:00

## 2023-09-25 RX ADMIN — LACTULOSE 30 G: 10 SOLUTION ORAL at 14:08

## 2023-09-25 NOTE — PLAN OF CARE
Problem: Neurosensory - Adult  Goal: Achieves stable or improved neurological status  Outcome: Progressing     Problem: Neurosensory - Adult  Goal: Achieves stable or improved neurological status  Outcome: Progressing

## 2023-09-25 NOTE — CARE COORDINATION
1:03 PM  CHERYL torres. Nephro/ GI following.   Patient will return to [facility] signed by Kaley Stout RN, CM on 9/22/2023 at 1:04 PM.  Phone: [phone]  Fax: [phone]
Case Management Assessment            Discharge Note                    Date / Time of Note: 9/25/2023 1:23 PM                  Discharge Note Completed by: Dang Hsu RN    Patient Name: Nathan Spears   YOB: 1965  Diagnosis: Hepatic encephalopathy ([address]) [K76.82]  ALONA (acute kidney injury) ([address]) [N17.9]  Acute encephalopathy [G93.40]  Altered mental status, unspecified altered mental status type [R41.82]   Date / Time: 9/17/2023  9:26 PM    Current PCP: Satnam Olivas MD  Clinic patient: Yes    Hospitalization in the last 30 days: Yes    Advance Directives:  Code Status: Full Code  [place] DNR form completed and on chart: Not Indicated    Financial:  Payor: Kenn OHARA / Plan: Kenn OHARA / Product Type: *No Product type* /      Pharmacy:    [facility] [address], [address] [phone] - F [phone]  [facility] [place] [place] [address]  Phone: [phone] Fax: [phone]      Assistance purchasing medications?: Potential Assistance Purchasing Medications: No  Assistance provided by Case Management: None at this time    Does patient want to participate in local refill/ meds to beds program?: No    Meds To Beds General Rules:  1. Can ONLY be done Monday- Friday between 8:30am-5pm  2. Prescription(s) must be in pharmacy by 3pm to be filled same day  3. Copy of patient's insurance/ prescription drug card and patient face sheet must be sent along with the prescription(s)  4. Cost of Rx cannot be added to hospital bill. If financial assistance is needed, please contact unit  or ;  or  CANNOT provide pharmacy voucher for patients co-pays  5.  Patients can then  the prescription on their way out of the hospital at discharge, or pharmacy can deliver to the bedside if staff is available. (payment due at time of pick-up or delivery - cash, check, or card accepted)     Able to afford
facilitating achievement of predicted outcomes: Caregiver support    Barriers to discharge: Confusion, Cognitive deficit, Limited safety awareness, and Medical complications    Additional Case Management Notes: patient is LTC at [place], plans for patient to return at [place]. Admitted for Hepatic encephalopathy ([address]) [K76.82]  ALONA (acute kidney injury) ([address]) [N17.9]  Acute encephalopathy [G93.40]  Altered mental status, unspecified altered mental status type [R41.82]  Currently receiving lactulose enemas. Awaiting GI consult. CM will continue to follow for dc planning and support. The Plan for Transition of Care is related to the following treatment goals of Hepatic encephalopathy ([address]) [K76.82]  ALONA (acute kidney injury) ([address]) [N17.9]  Acute encephalopathy [G93.40]  Altered mental status, unspecified altered mental status type [P37.40]    IF APPLICABLE: The Patient and/or patient representative [employer] and her family were provided with a choice of provider and agrees with the discharge plan. Freedom of choice list with basic dialogue that supports the patient's individualized plan of care/goals and shares the quality data associated with the providers was provided to:     Patient Representative Name:       The Patient and/or Patient Representative Agree with the Discharge Plan?       Gabriel Gallo RN  Case Management Department  Ph: [phone] Fax: [phone]

## 2023-09-25 NOTE — PLAN OF CARE
Problem: Discharge Planning  Goal: Discharge to home or other facility with appropriate resources  Outcome: Progressing     Problem: Pain  Goal: Verbalizes/displays adequate comfort level or baseline comfort level  Outcome: Progressing     Problem: Safety - Adult  Goal: Free from fall injury  9/25/2023 0927 by Gina Santiago RN  Outcome: Progressing  9/25/2023 0300 by Lindsay Kingsley RN  Outcome: Progressing     Problem: Skin/Tissue Integrity  Goal: Absence of new skin breakdown  Description: 1. Monitor for areas of redness and/or skin breakdown  2. Assess vascular access sites hourly  3. Every 4-6 hours minimum:  Change oxygen saturation probe site  4. Every 4-6 hours:  If on nasal continuous positive airway pressure, respiratory therapy assess nares and determine need for appliance change or resting period.   Outcome: Progressing     Problem: Nutrition Deficit:  Goal: Optimize nutritional status  Outcome: Progressing     Problem: Neurosensory - Adult  Goal: Achieves stable or improved neurological status  Outcome: Progressing     Problem: Skin/Tissue Integrity - Adult  Goal: Skin integrity remains intact  Outcome: Progressing     Problem: Gastrointestinal - Adult  Goal: Maintains adequate nutritional intake  Outcome: Progressing

## 2023-09-25 NOTE — DISCHARGE SUMMARY
V2.0  Discharge Summary    Name:  Seema Pathak /Age/Sex: 1965 (65 y.o. female)   Admit Date: 2023  Discharge Date: 23    MRN & CSN:  [id number] & [id number] Encounter Date and Time 23 12:42 PM EDT    Attending:  Jeri Verdin MD Discharging Provider: Jeri Verdin MD       Discharge Diagnosis:   Acute metabolic/toxic encephalopathy multifactorial likely due to hepatic encephalopathy plus use of narcotics/benzos  LOYOLA  ALONA likely hepatorenal/prerenal  Positive troponin with low suspicious of ACS  Stage II pressure ulcer POA left buttock and right posterior thigh  Hypothyroidism  Panhypopituitarism  Morbid obesity  Obstructive sleep apnea        Hospital Course:     Brief HPI: Seema Pathak is a 65 y.o. female who presented with from LTC/ECF with altered mental status    Brief Problem Based Course:     Admitted for hepatic encephalopathy with ammonia level elevated to 102 likely due to noncompliance. Was consulted as well as nephrology for hepatorenal syndrome. She was started on lactulose and rifaximin and titrate to 2-3 bowel movement and her mental status improved. She received fluid boluses and her serum creatinine improved from 1.9 on admission to 1.2. At this point of time she is awake alert oriented to self as well as place but does not remember the time I's and this is her baseline as it has been communicated for LTC to the nursing staff. Please avoid narcotics or benzodiazepine    The patient expressed appropriate understanding of, and agreement with the discharge recommendations, medications, and plan. Consults this admission:  IP CONSULT TO GI  IP CONSULT TO SOCIAL WORK  IP CONSULT TO NEPHROLOGY    Comment: Please note this report has been produced using speech recognition software and may contain errors related to that system including errors in grammar, punctuation, and spelling, as well as words and phrases that may be inappropriate.  If there are any questions or